# Patient Record
Sex: MALE | Race: WHITE | NOT HISPANIC OR LATINO | Employment: OTHER
[De-identification: names, ages, dates, MRNs, and addresses within clinical notes are randomized per-mention and may not be internally consistent; named-entity substitution may affect disease eponyms.]

---

## 2017-03-09 ENCOUNTER — RECORDS - HEALTHEAST (OUTPATIENT)
Dept: ADMINISTRATIVE | Facility: OTHER | Age: 62
End: 2017-03-09

## 2021-05-27 ENCOUNTER — RECORDS - HEALTHEAST (OUTPATIENT)
Dept: ADMINISTRATIVE | Facility: CLINIC | Age: 66
End: 2021-05-27

## 2021-06-01 ENCOUNTER — RECORDS - HEALTHEAST (OUTPATIENT)
Dept: ADMINISTRATIVE | Facility: CLINIC | Age: 66
End: 2021-06-01

## 2021-07-13 ENCOUNTER — LAB REQUISITION (OUTPATIENT)
Dept: LAB | Facility: CLINIC | Age: 66
End: 2021-07-13
Payer: MEDICARE

## 2021-07-13 DIAGNOSIS — M81.0 AGE-RELATED OSTEOPOROSIS WITHOUT CURRENT PATHOLOGICAL FRACTURE: ICD-10-CM

## 2021-07-13 DIAGNOSIS — K21.9 GASTRO-ESOPHAGEAL REFLUX DISEASE WITHOUT ESOPHAGITIS: ICD-10-CM

## 2021-07-13 DIAGNOSIS — G93.49 OTHER ENCEPHALOPATHY: ICD-10-CM

## 2021-07-13 LAB
ALBUMIN SERPL-MCNC: 2.6 G/DL (ref 3.5–5)
ALP SERPL-CCNC: 62 U/L (ref 45–120)
ALT SERPL W P-5'-P-CCNC: 11 U/L (ref 0–45)
ANION GAP SERPL CALCULATED.3IONS-SCNC: 14 MMOL/L (ref 5–18)
AST SERPL W P-5'-P-CCNC: 12 U/L (ref 0–40)
BASOPHILS # BLD AUTO: 0.1 10E3/UL (ref 0–0.2)
BASOPHILS NFR BLD AUTO: 1 %
BILIRUB SERPL-MCNC: 0.3 MG/DL (ref 0–1)
BUN SERPL-MCNC: 13 MG/DL (ref 8–22)
CALCIUM SERPL-MCNC: 9.2 MG/DL (ref 8.5–10.5)
CHLORIDE BLD-SCNC: 103 MMOL/L (ref 98–107)
CO2 SERPL-SCNC: 23 MMOL/L (ref 22–31)
CREAT SERPL-MCNC: 0.68 MG/DL (ref 0.7–1.3)
EOSINOPHIL # BLD AUTO: 0.1 10E3/UL (ref 0–0.7)
EOSINOPHIL NFR BLD AUTO: 2 %
ERYTHROCYTE [DISTWIDTH] IN BLOOD BY AUTOMATED COUNT: 14.1 % (ref 10–15)
GFR SERPL CREATININE-BSD FRML MDRD: >90 ML/MIN/1.73M2
GLUCOSE BLD-MCNC: 80 MG/DL (ref 70–125)
HCT VFR BLD AUTO: 37.4 % (ref 40–53)
HGB BLD-MCNC: 12.5 G/DL (ref 13.3–17.7)
IMM GRANULOCYTES # BLD: 0.1 10E3/UL
IMM GRANULOCYTES NFR BLD: 1 %
IRON SATN MFR SERPL: 27 % (ref 20–50)
IRON SERPL-MCNC: 86 UG/DL (ref 42–175)
LYMPHOCYTES # BLD AUTO: 3.1 10E3/UL (ref 0.8–5.3)
LYMPHOCYTES NFR BLD AUTO: 39 %
MAGNESIUM SERPL-MCNC: 1.7 MG/DL (ref 1.8–2.6)
MCH RBC QN AUTO: 34.1 PG (ref 26.5–33)
MCHC RBC AUTO-ENTMCNC: 33.4 G/DL (ref 31.5–36.5)
MCV RBC AUTO: 102 FL (ref 78–100)
MONOCYTES # BLD AUTO: 0.6 10E3/UL (ref 0–1.3)
MONOCYTES NFR BLD AUTO: 8 %
NEUTROPHILS # BLD AUTO: 3.8 10E3/UL (ref 1.6–8.3)
NEUTROPHILS NFR BLD AUTO: 49 %
NRBC # BLD AUTO: 0 10E3/UL
NRBC BLD AUTO-RTO: 0 /100
PLATELET # BLD AUTO: 473 10E3/UL (ref 150–450)
POTASSIUM BLD-SCNC: 4.8 MMOL/L (ref 3.5–5)
PROT SERPL-MCNC: 6.2 G/DL (ref 6–8)
RBC # BLD AUTO: 3.67 10E6/UL (ref 4.4–5.9)
SODIUM SERPL-SCNC: 140 MMOL/L (ref 136–145)
TIBC SERPL-MCNC: 318 UG/DL (ref 313–563)
TRANSFERRIN SERPL-MCNC: 254 MG/DL (ref 212–360)
TSH SERPL DL<=0.005 MIU/L-ACNC: 2.48 UIU/ML (ref 0.3–5)
VIT B12 SERPL-MCNC: 1148 PG/ML (ref 213–816)
WBC # BLD AUTO: 7.8 10E3/UL (ref 4–11)

## 2021-07-13 PROCEDURE — 36415 COLL VENOUS BLD VENIPUNCTURE: CPT | Mod: ORL | Performed by: NURSE PRACTITIONER

## 2021-07-13 PROCEDURE — 85025 COMPLETE CBC W/AUTO DIFF WBC: CPT | Mod: ORL | Performed by: NURSE PRACTITIONER

## 2021-07-13 PROCEDURE — P9604 ONE-WAY ALLOW PRORATED TRIP: HCPCS | Performed by: NURSE PRACTITIONER

## 2021-07-13 PROCEDURE — 84443 ASSAY THYROID STIM HORMONE: CPT | Mod: ORL | Performed by: NURSE PRACTITIONER

## 2021-07-13 PROCEDURE — 83540 ASSAY OF IRON: CPT | Mod: ORL | Performed by: NURSE PRACTITIONER

## 2021-07-13 PROCEDURE — 80053 COMPREHEN METABOLIC PANEL: CPT | Performed by: NURSE PRACTITIONER

## 2021-07-13 PROCEDURE — 82607 VITAMIN B-12: CPT | Mod: ORL | Performed by: NURSE PRACTITIONER

## 2021-07-13 PROCEDURE — 83735 ASSAY OF MAGNESIUM: CPT | Mod: ORL | Performed by: NURSE PRACTITIONER

## 2021-07-18 ENCOUNTER — LAB REQUISITION (OUTPATIENT)
Dept: LAB | Facility: CLINIC | Age: 66
End: 2021-07-18
Payer: MEDICARE

## 2021-07-18 DIAGNOSIS — D64.89 OTHER SPECIFIED ANEMIAS: ICD-10-CM

## 2021-07-19 LAB
FOLATE SERPL-MCNC: 11.4 NG/ML
HGB BLD-MCNC: 12.9 G/DL (ref 13.3–17.7)

## 2021-07-19 PROCEDURE — 36415 COLL VENOUS BLD VENIPUNCTURE: CPT | Performed by: INTERNAL MEDICINE

## 2021-07-19 PROCEDURE — P9604 ONE-WAY ALLOW PRORATED TRIP: HCPCS | Performed by: INTERNAL MEDICINE

## 2021-07-19 PROCEDURE — 85018 HEMOGLOBIN: CPT | Performed by: INTERNAL MEDICINE

## 2021-07-19 PROCEDURE — 82746 ASSAY OF FOLIC ACID SERUM: CPT | Performed by: INTERNAL MEDICINE

## 2021-07-29 ENCOUNTER — HOSPITAL ENCOUNTER (INPATIENT)
Facility: CLINIC | Age: 66
LOS: 8 days | Discharge: NURSING, REHAB OR LONG-TERM CARE | DRG: 689 | End: 2021-08-06
Attending: EMERGENCY MEDICINE | Admitting: INTERNAL MEDICINE
Payer: MEDICARE

## 2021-07-29 ENCOUNTER — APPOINTMENT (OUTPATIENT)
Dept: CT IMAGING | Facility: CLINIC | Age: 66
DRG: 689 | End: 2021-07-29
Attending: EMERGENCY MEDICINE
Payer: MEDICARE

## 2021-07-29 ENCOUNTER — APPOINTMENT (OUTPATIENT)
Dept: RADIOLOGY | Facility: CLINIC | Age: 66
DRG: 689 | End: 2021-07-29
Attending: EMERGENCY MEDICINE
Payer: MEDICARE

## 2021-07-29 ENCOUNTER — APPOINTMENT (OUTPATIENT)
Dept: ULTRASOUND IMAGING | Facility: CLINIC | Age: 66
DRG: 689 | End: 2021-07-29
Attending: EMERGENCY MEDICINE
Payer: MEDICARE

## 2021-07-29 DIAGNOSIS — R59.0 MEDIASTINAL LYMPHADENOPATHY: ICD-10-CM

## 2021-07-29 DIAGNOSIS — N28.1 KIDNEY CYSTS: Primary | ICD-10-CM

## 2021-07-29 DIAGNOSIS — J98.4 PNEUMONITIS: ICD-10-CM

## 2021-07-29 DIAGNOSIS — I63.9 CEREBROVASCULAR ACCIDENT (CVA), UNSPECIFIED MECHANISM (H): ICD-10-CM

## 2021-07-29 DIAGNOSIS — N39.0 URINARY TRACT INFECTION WITHOUT HEMATURIA, SITE UNSPECIFIED: ICD-10-CM

## 2021-07-29 DIAGNOSIS — D73.5 SPLENIC INFARCT: ICD-10-CM

## 2021-07-29 PROBLEM — G43.909 MIGRAINE HEADACHE: Status: ACTIVE | Noted: 2021-07-29

## 2021-07-29 PROBLEM — N31.9 NEUROGENIC BLADDER: Status: ACTIVE | Noted: 2021-07-29

## 2021-07-29 PROBLEM — L25.9 CONTACT DERMATITIS AND OTHER ECZEMA: Status: ACTIVE | Noted: 2021-07-29

## 2021-07-29 PROBLEM — M79.609 PAIN IN EXTREMITY: Status: ACTIVE | Noted: 2021-07-29

## 2021-07-29 PROBLEM — M47.816 LUMBAR SPONDYLOSIS: Status: ACTIVE | Noted: 2021-07-29

## 2021-07-29 PROBLEM — E22.9 OTHER AND UNSPECIFIED ANTERIOR PITUITARY HYPERFUNCTION: Status: ACTIVE | Noted: 2021-07-29

## 2021-07-29 PROBLEM — K21.9 GASTROESOPHAGEAL REFLUX DISEASE: Status: ACTIVE | Noted: 2021-07-29

## 2021-07-29 PROBLEM — M70.70 BURSITIS OF HIP: Status: ACTIVE | Noted: 2021-07-29

## 2021-07-29 PROBLEM — N52.9 IMPOTENCE OF ORGANIC ORIGIN: Status: ACTIVE | Noted: 2021-07-29

## 2021-07-29 PROBLEM — M84.48XA PATHOLOGIC FRACTURE OF VERTEBRAE: Status: ACTIVE | Noted: 2021-07-29

## 2021-07-29 PROBLEM — M54.30 SCIATICA: Status: ACTIVE | Noted: 2021-07-29

## 2021-07-29 PROBLEM — Z91.81 HISTORY OF FALL: Status: ACTIVE | Noted: 2021-07-29

## 2021-07-29 PROBLEM — M81.0 OSTEOPOROSIS: Status: ACTIVE | Noted: 2021-07-29

## 2021-07-29 PROBLEM — M23.302 DERANGEMENT OF MENISCUS, NOT ELSEWHERE CLASSIFIED: Status: ACTIVE | Noted: 2021-07-29

## 2021-07-29 PROBLEM — F17.200 TOBACCO USE DISORDER: Status: ACTIVE | Noted: 2021-07-29

## 2021-07-29 PROBLEM — S92.053A: Status: ACTIVE | Noted: 2021-07-29

## 2021-07-29 PROBLEM — M54.50 LOW BACK PAIN: Status: ACTIVE | Noted: 2021-07-29

## 2021-07-29 PROBLEM — R31.9 HEMATURIA SYNDROME: Status: ACTIVE | Noted: 2021-07-29

## 2021-07-29 PROBLEM — G60.8 HEREDITARY SENSORY NEUROPATHY: Status: ACTIVE | Noted: 2021-07-29

## 2021-07-29 PROBLEM — G47.00 INSOMNIA, UNSPECIFIED: Status: ACTIVE | Noted: 2021-07-29

## 2021-07-29 LAB
ALBUMIN SERPL-MCNC: 2.7 G/DL (ref 3.5–5)
ALBUMIN UR-MCNC: 30 MG/DL
ALP SERPL-CCNC: 57 U/L (ref 45–120)
ALT SERPL W P-5'-P-CCNC: 14 U/L (ref 0–45)
ANION GAP SERPL CALCULATED.3IONS-SCNC: 10 MMOL/L (ref 5–18)
APPEARANCE UR: ABNORMAL
AST SERPL W P-5'-P-CCNC: 22 U/L (ref 0–40)
BACTERIA #/AREA URNS HPF: ABNORMAL /HPF
BASOPHILS # BLD AUTO: 0.1 10E3/UL (ref 0–0.2)
BASOPHILS NFR BLD AUTO: 1 %
BILIRUB SERPL-MCNC: 0.5 MG/DL (ref 0–1)
BILIRUB UR QL STRIP: NEGATIVE
BUN SERPL-MCNC: 21 MG/DL (ref 8–22)
CALCIUM SERPL-MCNC: 8.8 MG/DL (ref 8.5–10.5)
CHLORIDE BLD-SCNC: 103 MMOL/L (ref 98–107)
CO2 SERPL-SCNC: 23 MMOL/L (ref 22–31)
COLOR UR AUTO: YELLOW
CREAT SERPL-MCNC: 0.76 MG/DL (ref 0.7–1.3)
EOSINOPHIL # BLD AUTO: 0 10E3/UL (ref 0–0.7)
EOSINOPHIL NFR BLD AUTO: 0 %
ERYTHROCYTE [DISTWIDTH] IN BLOOD BY AUTOMATED COUNT: 14.8 % (ref 10–15)
GFR SERPL CREATININE-BSD FRML MDRD: >90 ML/MIN/1.73M2
GLUCOSE BLD-MCNC: 87 MG/DL (ref 70–125)
GLUCOSE UR STRIP-MCNC: NEGATIVE MG/DL
HCT VFR BLD AUTO: 35.1 % (ref 40–53)
HGB BLD-MCNC: 11.8 G/DL (ref 13.3–17.7)
HGB UR QL STRIP: ABNORMAL
IMM GRANULOCYTES # BLD: 0 10E3/UL
IMM GRANULOCYTES NFR BLD: 0 %
KETONES UR STRIP-MCNC: NEGATIVE MG/DL
LACTATE SERPL-SCNC: 0.8 MMOL/L (ref 0.7–2)
LEUKOCYTE ESTERASE UR QL STRIP: ABNORMAL
LYMPHOCYTES # BLD AUTO: 0.8 10E3/UL (ref 0.8–5.3)
LYMPHOCYTES NFR BLD AUTO: 16 %
MCH RBC QN AUTO: 33.1 PG (ref 26.5–33)
MCHC RBC AUTO-ENTMCNC: 33.6 G/DL (ref 31.5–36.5)
MCV RBC AUTO: 98 FL (ref 78–100)
MONOCYTES # BLD AUTO: 0.7 10E3/UL (ref 0–1.3)
MONOCYTES NFR BLD AUTO: 13 %
MUCOUS THREADS #/AREA URNS LPF: PRESENT /LPF
NEUTROPHILS # BLD AUTO: 3.6 10E3/UL (ref 1.6–8.3)
NEUTROPHILS NFR BLD AUTO: 70 %
NITRATE UR QL: POSITIVE
NRBC # BLD AUTO: 0 10E3/UL
NRBC BLD AUTO-RTO: 0 /100
PH UR STRIP: 7.5 [PH] (ref 5–7)
PLATELET # BLD AUTO: 145 10E3/UL (ref 150–450)
POTASSIUM BLD-SCNC: 3.9 MMOL/L (ref 3.5–5)
PROT SERPL-MCNC: 6.6 G/DL (ref 6–8)
RBC # BLD AUTO: 3.57 10E6/UL (ref 4.4–5.9)
RBC URINE: 34 /HPF
SARS-COV-2 RNA RESP QL NAA+PROBE: NEGATIVE
SODIUM SERPL-SCNC: 136 MMOL/L (ref 136–145)
SP GR UR STRIP: 1.02 (ref 1–1.03)
SQUAMOUS EPITHELIAL: <1 /HPF
UROBILINOGEN UR STRIP-MCNC: <2 MG/DL
WBC # BLD AUTO: 5.2 10E3/UL (ref 4–11)
WBC CLUMPS #/AREA URNS HPF: PRESENT /HPF
WBC URINE: >182 /HPF

## 2021-07-29 PROCEDURE — 71275 CT ANGIOGRAPHY CHEST: CPT

## 2021-07-29 PROCEDURE — 99285 EMERGENCY DEPT VISIT HI MDM: CPT | Mod: 25

## 2021-07-29 PROCEDURE — 258N000003 HC RX IP 258 OP 636: Performed by: EMERGENCY MEDICINE

## 2021-07-29 PROCEDURE — 250N000011 HC RX IP 250 OP 636: Performed by: EMERGENCY MEDICINE

## 2021-07-29 PROCEDURE — 93971 EXTREMITY STUDY: CPT | Mod: RT

## 2021-07-29 PROCEDURE — 74177 CT ABD & PELVIS W/CONTRAST: CPT

## 2021-07-29 PROCEDURE — 82040 ASSAY OF SERUM ALBUMIN: CPT | Performed by: EMERGENCY MEDICINE

## 2021-07-29 PROCEDURE — 250N000013 HC RX MED GY IP 250 OP 250 PS 637: Performed by: EMERGENCY MEDICINE

## 2021-07-29 PROCEDURE — 70450 CT HEAD/BRAIN W/O DYE: CPT

## 2021-07-29 PROCEDURE — 87086 URINE CULTURE/COLONY COUNT: CPT | Performed by: EMERGENCY MEDICINE

## 2021-07-29 PROCEDURE — 96365 THER/PROPH/DIAG IV INF INIT: CPT | Mod: 59

## 2021-07-29 PROCEDURE — 87186 SC STD MICRODIL/AGAR DIL: CPT | Performed by: EMERGENCY MEDICINE

## 2021-07-29 PROCEDURE — 87635 SARS-COV-2 COVID-19 AMP PRB: CPT | Performed by: EMERGENCY MEDICINE

## 2021-07-29 PROCEDURE — 73630 X-RAY EXAM OF FOOT: CPT | Mod: RT

## 2021-07-29 PROCEDURE — 81001 URINALYSIS AUTO W/SCOPE: CPT | Performed by: EMERGENCY MEDICINE

## 2021-07-29 PROCEDURE — 120N000001 HC R&B MED SURG/OB

## 2021-07-29 PROCEDURE — 36415 COLL VENOUS BLD VENIPUNCTURE: CPT | Performed by: EMERGENCY MEDICINE

## 2021-07-29 PROCEDURE — C9803 HOPD COVID-19 SPEC COLLECT: HCPCS

## 2021-07-29 PROCEDURE — 85025 COMPLETE CBC W/AUTO DIFF WBC: CPT | Performed by: EMERGENCY MEDICINE

## 2021-07-29 PROCEDURE — 83605 ASSAY OF LACTIC ACID: CPT | Performed by: EMERGENCY MEDICINE

## 2021-07-29 RX ORDER — ATORVASTATIN CALCIUM 80 MG/1
40 TABLET, FILM COATED ORAL AT BEDTIME
COMMUNITY
Start: 2020-09-17

## 2021-07-29 RX ORDER — IOPAMIDOL 755 MG/ML
100 INJECTION, SOLUTION INTRAVASCULAR ONCE
Status: COMPLETED | OUTPATIENT
Start: 2021-07-29 | End: 2021-07-29

## 2021-07-29 RX ORDER — IBUPROFEN 400 MG/1
400 TABLET, FILM COATED ORAL 3 TIMES DAILY PRN
Status: ON HOLD | COMMUNITY
End: 2021-08-06

## 2021-07-29 RX ORDER — DIVALPROEX SODIUM 500 MG/1
1000 TABLET, EXTENDED RELEASE ORAL EVERY MORNING
COMMUNITY
Start: 2020-12-16

## 2021-07-29 RX ORDER — ACETAMINOPHEN 500 MG
1000 TABLET ORAL ONCE
Status: COMPLETED | OUTPATIENT
Start: 2021-07-29 | End: 2021-07-29

## 2021-07-29 RX ORDER — VENLAFAXINE 100 MG/1
100 TABLET ORAL 3 TIMES DAILY
COMMUNITY
Start: 2020-07-02 | End: 2021-07-29

## 2021-07-29 RX ORDER — GABAPENTIN 300 MG/1
300 CAPSULE ORAL 3 TIMES DAILY
COMMUNITY
Start: 2021-03-05 | End: 2021-07-29

## 2021-07-29 RX ORDER — AMOXICILLIN 250 MG
2 CAPSULE ORAL 2 TIMES DAILY PRN
COMMUNITY
Start: 2020-08-07

## 2021-07-29 RX ORDER — LISINOPRIL 40 MG/1
30 TABLET ORAL DAILY
COMMUNITY

## 2021-07-29 RX ORDER — ALFUZOSIN HYDROCHLORIDE 10 MG/1
10 TABLET, EXTENDED RELEASE ORAL DAILY
COMMUNITY
Start: 2021-04-19

## 2021-07-29 RX ORDER — MULTIVITAMIN
1 CAPSULE ORAL DAILY
COMMUNITY
Start: 2021-07-01

## 2021-07-29 RX ORDER — ASCORBIC ACID 500 MG
500 TABLET ORAL DAILY
COMMUNITY

## 2021-07-29 RX ORDER — ASPIRIN 325 MG
325 TABLET ORAL DAILY
COMMUNITY
Start: 2021-07-01

## 2021-07-29 RX ORDER — ACETAMINOPHEN 325 MG/1
650 TABLET ORAL EVERY 6 HOURS PRN
COMMUNITY
Start: 2021-07-01

## 2021-07-29 RX ORDER — ALENDRONATE SODIUM 70 MG/1
70 TABLET ORAL WEEKLY
COMMUNITY
Start: 2021-01-25

## 2021-07-29 RX ORDER — DIVALPROEX SODIUM 500 MG/1
1500 TABLET, EXTENDED RELEASE ORAL AT BEDTIME
COMMUNITY

## 2021-07-29 RX ORDER — CEFTRIAXONE 1 G/1
1 INJECTION, POWDER, FOR SOLUTION INTRAMUSCULAR; INTRAVENOUS ONCE
Status: COMPLETED | OUTPATIENT
Start: 2021-07-29 | End: 2021-07-29

## 2021-07-29 RX ORDER — BACLOFEN 20 MG/1
20 TABLET ORAL 3 TIMES DAILY
Status: ON HOLD | COMMUNITY
Start: 2021-04-19 | End: 2021-08-06

## 2021-07-29 RX ADMIN — ACETAMINOPHEN 1000 MG: 500 TABLET, FILM COATED ORAL at 21:29

## 2021-07-29 RX ADMIN — CEFTRIAXONE 1 G: 1 INJECTION, POWDER, FOR SOLUTION INTRAMUSCULAR; INTRAVENOUS at 19:25

## 2021-07-29 RX ADMIN — SODIUM CHLORIDE 1000 ML: 9 INJECTION, SOLUTION INTRAVENOUS at 19:24

## 2021-07-29 RX ADMIN — IOPAMIDOL 100 ML: 755 INJECTION, SOLUTION INTRAVENOUS at 21:36

## 2021-07-29 ASSESSMENT — ENCOUNTER SYMPTOMS
DIFFICULTY URINATING: 1
DYSURIA: 1
FEVER: 1
ARTHRALGIAS: 1
ABDOMINAL PAIN: 0

## 2021-07-29 NOTE — ED PROVIDER NOTES
EMERGENCY DEPARTMENT ENCOUNTER      NAME: Goldy Peraza  AGE: 66 year old male  YOB: 1955  MRN: [id number]  EVALUATION DATE & TIME: 7/29/2021  5:45 PM    PCP: No primary care provider on file.    ED PROVIDER: Alma Gonzalez DO      Chief Complaint   Patient presents with     Dysuria         FINAL IMPRESSION:  1. Urinary tract infection without hematuria, site unspecified    2. Pneumonitis          ED COURSE & MEDICAL DECISION MAKING:    Pertinent Labs & Imaging studies reviewed. (See chart for details)  6:23 PM I met with the patient and performed my initial exam.  I discussed the plan for care and the patient is agreeable with this plan. PPE: Provider wore gloves, and paper mask.  6:48 I spoke with the patient's son.  I re-entered the room when family arrived to obtain more history  10:12 PM I rechecked and updated the patient.  12:27 AM I rechecked and updated the patient.  12:34 AM I discussed the case with hospitalist, Dr Gates, who accepts the patient.    66 year old male presents to the Emergency Department for evaluation of dysuria, urinary frequency.  Has been going on for several days.  Patient with a history of a prior stroke, right-sided pain.  Patient did have a fall a few days ago, family noted swelling to his right leg since then.  He initially had pain to his right foot.  On exam today, patient noting pain to his right arm and right hip.  Patient notes chronic pain to his right side since his stroke, however does seem to be endorsing more pain to his hip.  Recent admission to [facility] for acute encephalopathy, he did have a UTI at that time but was not thought to be the cause of his symptoms.  Patient is at his baseline here per family.  Given his fall, obtain CT of the head.  CTA chest obtained as patient was hypoxic to 88% on room air, this shows a mild to moderate pneumonitis.  His Covid is negative.  No lactic acidosis.  CT of the abdomen pelvis does show possible focal area of  acute splenic infarct.  Renal cyst that needs to be followed up on.  Right hip without any acute findings.  Given patient's oxygen requirement with his infection, will admit.  Given ceftriaxone.  Patient boarding in the ED, discussed with hospitalist at 12:34 AM about patient's care.    At the conclusion of the encounter I discussed the results of all of the tests and the disposition. The questions were answered. The patient or family acknowledged understanding and was agreeable with the care plan.       MEDICATIONS GIVEN IN THE EMERGENCY:  Medications   sodium chloride (PF) 0.9% PF flush 3 mL (has no administration in time range)   sodium chloride (PF) 0.9% PF flush 3 mL (has no administration in time range)   cefTRIAXone (ROCEPHIN) 1 g vial to attach to  mL bag for ADULTS or NS 50 mL bag for PEDS (0 g Intravenous Stopped 7/29/21 2000)   0.9% sodium chloride BOLUS (0 mLs Intravenous Stopped 7/29/21 2100)   iopamidol (ISOVUE-370) solution 100 mL (100 mLs Intravenous Given 7/29/21 2136)   acetaminophen (TYLENOL) tablet 1,000 mg (1,000 mg Oral Given 7/29/21 2129)       NEW PRESCRIPTIONS STARTED AT TODAY'S ER VISIT  New Prescriptions    No medications on file          =================================================================    HPI    Patient information was obtained from: the patient and RN    Use of : N/A    HPI limited due to acuity of the patient's condition.       Goldy Peraza is a 66 year old male with a pertinent history of hypertension, stoke, altered mental status, hematuria syndrome, neurogenic bladder who presents to this ED by EMS for evaluation of dysuria.    Per RN, the patient has been having testicular pain for ~ 10 days.      Per EMS, the patient had a measurable fever.     The patient presents to the ED with Dysuria.  It was difficult to gather much information from the patient, but he did report that he has right arm pain, right leg pain, and has had difficulty urinating.       Upon re-entry after the patient's family presented: Per the patient's sister, the patient was in the hospital for several weeks after not waking up despite multiple people trying to stir him.  In the hospital, there was not a definitive diagnosis.  It was found that the patient did not have a stroke or seizure.  Also, the patient's sister noted, the patient contracted a urinary infection while in the hospital. The patient was walking yesterday and his therapist reported the patient was doing well.  However, he has had difficulty urinating and burning pain with urination for ~2-3 days (~7/26/21) after falling on 7/25/21. However, last night she reports he urinated a large amount. Today, the patient was out of sorts and his nurse called 911.    The patient's sister noted, the patient's right sided pain is constant.  The patient's son and sister both re-examined the patient's right leg which has been swollen and noted that is was better in the ED than it was before.      The patient is also reporting right hip pain which his son notes his new.      The patient denies fever, abdominal pain, testicular pain.      REVIEW OF SYSTEMS   Review of Systems   Unable to perform ROS: Acuity of condition   Constitutional: Positive for fever (measured by EMS).        The patient denies fever.   Gastrointestinal: Negative for abdominal pain.   Genitourinary: Positive for decreased urine volume, difficulty urinating, dysuria (burning pain) and testicular pain (per RN, patient denies this).   Musculoskeletal: Positive for arthralgias (right arm and leg pain, and right hip pain).        Positive for chronic right sided pain.  Positive for improved right foot swelling.   Neurological:        Positive for 'out of sorts'.       PAST MEDICAL HISTORY:  No past medical history on file.    PAST SURGICAL HISTORY:  Past Surgical History:   Procedure Laterality Date     IR MISCELLANEOUS PROCEDURE  3/18/2009     IR MISCELLANEOUS PROCEDURE   3/20/2009           CURRENT MEDICATIONS:    acetaminophen (TYLENOL) 325 MG tablet  alendronate (FOSAMAX) 70 MG tablet  alfuzosin ER (UROXATRAL) 10 MG 24 hr tablet  aspirin (ASA) 325 MG tablet  atorvastatin (LIPITOR) 80 MG tablet  calcium citrate-vitamin D (CITRACAL) 315-200 MG-UNIT TABS per tablet  cholecalciferol 25 MCG (1000 UT) TABS  diclofenac (VOLTAREN) 1 % topical gel  divalproex sodium extended-release (DEPAKOTE ER) 500 MG 24 hr tablet  divalproex sodium extended-release (DEPAKOTE ER) 500 MG 24 hr tablet  gabapentin (NEURONTIN) 300 MG capsule  ibuprofen (ADVIL/MOTRIN) 400 MG tablet  lisinopril (ZESTRIL) 40 MG tablet  multivitamin (DEKAS ESSENTIAL) capsule  omeprazole (PRILOSEC) 20 MG DR capsule  senna-docusate (SENOKOT-S/PERICOLACE) 8.6-50 MG tablet  vitamin C (ASCORBIC ACID) 500 MG tablet         ALLERGIES:  No Known Allergies    FAMILY HISTORY:  No family history on file.    SOCIAL HISTORY:   Social History     Socioeconomic History     Marital status: Single     Spouse name: Not on file     Number of children: Not on file     Years of education: Not on file     Highest education level: Not on file   Occupational History     Not on file   Tobacco Use     Smoking status: Not on file   Substance and Sexual Activity     Alcohol use: Not on file     Drug use: Not on file     Sexual activity: Not on file   Other Topics Concern     Not on file   Social History Narrative     Not on file     Social Determinants of Health     Financial Resource Strain:      Difficulty of Paying Living Expenses:    Food Insecurity:      Worried About Running Out of Food in the Last Year:      Ran Out of Food in the Last Year:    Transportation Needs:      Lack of Transportation (Medical):      Lack of Transportation (Non-Medical):    Physical Activity:      Days of Exercise per Week:      Minutes of Exercise per Session:    Stress:      Feeling of Stress :    Social Connections:      Frequency of Communication with Friends and  "Family:      Frequency of Social Gatherings with Friends and Family:      Attends Jewish Services:      Active Member of Clubs or Organizations:      Attends Club or Organization Meetings:      Marital Status:    Intimate Partner Violence:      Fear of Current or Ex-Partner:      Emotionally Abused:      Physically Abused:      Sexually Abused:        VITALS:  BP (!) 140/78   Pulse 65   Temp 99  F (37.2  C) (Oral)   Resp 27   Ht 1.778 m (5' 10\")   Wt 95.3 kg (210 lb)   SpO2 96%   BMI 30.13 kg/m      PHYSICAL EXAM    Physical Exam  Constitutional:       General: He is not in acute distress.  HENT:      Head: Normocephalic and atraumatic.      Mouth/Throat:      Pharynx: Oropharynx is clear.   Eyes:      Pupils: Pupils are equal, round, and reactive to light.   Cardiovascular:      Rate and Rhythm: Normal rate and regular rhythm.      Pulses: Normal pulses.           Radial pulses are 2+ on the right side and 2+ on the left side.        Dorsalis pedis pulses are 2+ on the right side and 2+ on the left side.      Heart sounds: Normal heart sounds.   Pulmonary:      Effort: Pulmonary effort is normal.      Breath sounds: Normal breath sounds.   Abdominal:      General: Abdomen is flat.      Palpations: Abdomen is soft.      Tenderness: There is generalized abdominal tenderness.   Genitourinary:     Penis: Normal.       Testes: Normal.   Musculoskeletal:         General: Normal range of motion.      Right lower leg: Edema present.      Comments: Right hip and right foot tenderness. Right lower extremity tenderness. Contracture of the right upper extremity with right upper extremity tenderness.    Skin:     General: Skin is warm and dry.      Capillary Refill: Capillary refill takes less than 2 seconds.   Neurological:      General: No focal deficit present.      Mental Status: He is alert and oriented to person, place, and time.           LAB:  All pertinent labs reviewed and interpreted.  Results for orders " placed or performed during the hospital encounter of 07/29/21   Foot  XR, G/E 3 views, right    Impression    IMPRESSION:   Moderate hallux valgus. Diffuse osteopenia. No evidence of acute fracture. Mild first MTP joint degenerative changes.   CT Chest Pulmonary Embolism w Contrast    Impression    IMPRESSION:  1.  Negative for pulmonary embolism. Enlargement of the central pulmonary arteries suggest pulmonary arterial hypertension.    2.  Interstitial fibrotic change in the periphery of the lungs.    3.  Mild patchy scattered groundglass opacities in the periphery of the lungs are nonspecific and could be seen with a mild or low-grade pneumonitis. Clinical correlation.    4.  Mild bilateral hilar and mediastinal adenopathy. This may be reactive in nature but follow-up CT in 3 months is recommended for reevaluation to exclude any progressive process.    5.  Marked coronary artery calcification.   CT Abdomen Pelvis w Contrast    Impression    IMPRESSION:   1.  Focal area of peripheral decreased enhancement in the spleen medially is concerning for a small acute splenic infarct.  2.  Partially exophytic complex cyst in the right kidney lower pole measuring 6.2 cm with multiple septations. A cystic renal cell neoplasm cannot be excluded. An MRI of the kidneys without and with IV gadolinium is recommended in the near future.  3.  Multiple low-attenuation lesions in the liver. These may represent benign cysts and can be reassessed at the time of the renal MRI.  4.  Moderate changes of pulmonary fibrosis in both lung bases.  5.  Diffuse atheromatous plaque in the abdominal aorta.   Head CT w/o contrast    Impression    IMPRESSION:  1.  No acute intracranial process.   US Lower Extremity Venous Duplex Right    Impression    IMPRESSION:  1.  No deep venous thrombosis in the right lower extremity.   UA with Microscopic reflex to Culture    Specimen: Urine, Clean Catch   Result Value Ref Range    Color Urine Yellow Colorless,  Straw, Light Yellow, Yellow    Appearance Urine Turbid (A) Clear    Glucose Urine Negative Negative mg/dL    Bilirubin Urine Negative Negative    Ketones Urine Negative Negative mg/dL    Specific Gravity Urine 1.016 1.001 - 1.030    Blood Urine 0.1 mg/dL (A) Negative    pH Urine 7.5 (H) 5.0 - 7.0    Protein Albumin Urine 30  (A) Negative mg/dL    Urobilinogen Urine <2.0 <2.0 mg/dL    Nitrite Urine Positive (A) Negative    Leukocyte Esterase Urine 500 Juju/uL (A) Negative    Bacteria Urine Few (A) None Seen /HPF    WBC Clumps Urine Present (A) None Seen /HPF    Mucus Urine Present (A) None Seen /LPF    RBC Urine 34 (H) <=2 /HPF    WBC Urine >182 (H) <=5 /HPF    Squamous Epithelials Urine <1 <=1 /HPF   Comprehensive metabolic panel   Result Value Ref Range    Sodium 136 136 - 145 mmol/L    Potassium 3.9 3.5 - 5.0 mmol/L    Chloride 103 98 - 107 mmol/L    Carbon Dioxide (CO2) 23 22 - 31 mmol/L    Anion Gap 10 5 - 18 mmol/L    Urea Nitrogen 21 8 - 22 mg/dL    Creatinine 0.76 0.70 - 1.30 mg/dL    Calcium 8.8 8.5 - 10.5 mg/dL    Glucose 87 70 - 125 mg/dL    Alkaline Phosphatase 57 45 - 120 U/L    AST 22 0 - 40 U/L    ALT 14 0 - 45 U/L    Protein Total 6.6 6.0 - 8.0 g/dL    Albumin 2.7 (L) 3.5 - 5.0 g/dL    Bilirubin Total 0.5 0.0 - 1.0 mg/dL    GFR Estimate >90 >60 mL/min/1.73m2   Lactic acid whole blood   Result Value Ref Range    Lactic Acid 0.8 0.7 - 2.0 mmol/L   CBC with platelets and differential   Result Value Ref Range    WBC Count 5.2 4.0 - 11.0 10e3/uL    RBC Count 3.57 (L) 4.40 - 5.90 10e6/uL    Hemoglobin 11.8 (L) 13.3 - 17.7 g/dL    Hematocrit 35.1 (L) 40.0 - 53.0 %    MCV 98 78 - 100 fL    MCH 33.1 (H) 26.5 - 33.0 pg    MCHC 33.6 31.5 - 36.5 g/dL    RDW 14.8 10.0 - 15.0 %    Platelet Count 145 (L) 150 - 450 10e3/uL    % Neutrophils 70 %    % Lymphocytes 16 %    % Monocytes 13 %    % Eosinophils 0 %    % Basophils 1 %    % Immature Granulocytes 0 %    NRBCs per 100 WBC 0 <1 /100    Absolute Neutrophils 3.6 1.6  - 8.3 10e3/uL    Absolute Lymphocytes 0.8 0.8 - 5.3 10e3/uL    Absolute Monocytes 0.7 0.0 - 1.3 10e3/uL    Absolute Eosinophils 0.0 0.0 - 0.7 10e3/uL    Absolute Basophils 0.1 0.0 - 0.2 10e3/uL    Absolute Immature Granulocytes 0.0 <=0.0 10e3/uL    Absolute NRBCs 0.0 10e3/uL   SARS-COV2 (COVID-19) Virus RT-PCR    Specimen: Nasopharyngeal; Swab   Result Value Ref Range    SARS CoV2 PCR Negative Negative       RADIOLOGY:  Reviewed all pertinent imaging. Please see official radiology report.  US Lower Extremity Venous Duplex Right   Final Result   IMPRESSION:   1.  No deep venous thrombosis in the right lower extremity.      CT Abdomen Pelvis w Contrast   Final Result   IMPRESSION:    1.  Focal area of peripheral decreased enhancement in the spleen medially is concerning for a small acute splenic infarct.   2.  Partially exophytic complex cyst in the right kidney lower pole measuring 6.2 cm with multiple septations. A cystic renal cell neoplasm cannot be excluded. An MRI of the kidneys without and with IV gadolinium is recommended in the near future.   3.  Multiple low-attenuation lesions in the liver. These may represent benign cysts and can be reassessed at the time of the renal MRI.   4.  Moderate changes of pulmonary fibrosis in both lung bases.   5.  Diffuse atheromatous plaque in the abdominal aorta.      CT Chest Pulmonary Embolism w Contrast   Final Result   IMPRESSION:   1.  Negative for pulmonary embolism. Enlargement of the central pulmonary arteries suggest pulmonary arterial hypertension.      2.  Interstitial fibrotic change in the periphery of the lungs.      3.  Mild patchy scattered groundglass opacities in the periphery of the lungs are nonspecific and could be seen with a mild or low-grade pneumonitis. Clinical correlation.      4.  Mild bilateral hilar and mediastinal adenopathy. This may be reactive in nature but follow-up CT in 3 months is recommended for reevaluation to exclude any progressive  process.      5.  Marked coronary artery calcification.      Head CT w/o contrast   Final Result   IMPRESSION:   1.  No acute intracranial process.      Foot  XR, G/E 3 views, right   Final Result   IMPRESSION:    Moderate hallux valgus. Diffuse osteopenia. No evidence of acute fracture. Mild first MTP joint degenerative changes.            I, Primo Prasad, am serving as a scribe to document services personally performed by Dr. Alma Gonzalez based on my observation and the provider's statements to me. I, Alma Gonzalez, DO attest that Primo Prasad is acting in a scribe capacity, has observed my performance of the services and has documented them in accordance with my direction.    Alma Gonzalez DO  Emergency Medicine  [facility] EMERGENCY ROOM  [address] [facility] [facility] [address] [phone]  Dept: [phone]     Alma Gonzalez DO  07/30/21 0120

## 2021-07-29 NOTE — ED TRIAGE NOTES
"Arrives to ED via EMS with c/o dysuria and testicle pain x10 days. Per EMS report, pt febrile at 100.7F. Afebrile on arrival to ED. Appears lethargic. Answering questions with short answers. \"I don't want to talk\". 20g to L hand, IVF infusing. FSG 79. Pt also has R ankle/foot swelling, \"for a long time\".   "

## 2021-07-30 LAB
ALBUMIN SERPL-MCNC: 2.4 G/DL (ref 3.5–5)
ALP SERPL-CCNC: 51 U/L (ref 45–120)
ALT SERPL W P-5'-P-CCNC: 18 U/L (ref 0–45)
ANION GAP SERPL CALCULATED.3IONS-SCNC: 9 MMOL/L (ref 5–18)
AST SERPL W P-5'-P-CCNC: 26 U/L (ref 0–40)
BASOPHILS # BLD AUTO: 0.1 10E3/UL (ref 0–0.2)
BASOPHILS NFR BLD AUTO: 1 %
BILIRUB SERPL-MCNC: 0.4 MG/DL (ref 0–1)
BNP SERPL-MCNC: 135 PG/ML (ref 0–62)
BUN SERPL-MCNC: 17 MG/DL (ref 8–22)
CALCIUM SERPL-MCNC: 8.4 MG/DL (ref 8.5–10.5)
CHLORIDE BLD-SCNC: 105 MMOL/L (ref 98–107)
CO2 SERPL-SCNC: 23 MMOL/L (ref 22–31)
CREAT SERPL-MCNC: 0.7 MG/DL (ref 0.7–1.3)
EOSINOPHIL # BLD AUTO: 0 10E3/UL (ref 0–0.7)
EOSINOPHIL NFR BLD AUTO: 0 %
ERYTHROCYTE [DISTWIDTH] IN BLOOD BY AUTOMATED COUNT: 14.7 % (ref 10–15)
GFR SERPL CREATININE-BSD FRML MDRD: >90 ML/MIN/1.73M2
GLUCOSE BLD-MCNC: 74 MG/DL (ref 70–125)
HCT VFR BLD AUTO: 34.6 % (ref 40–53)
HGB BLD-MCNC: 11.6 G/DL (ref 13.3–17.7)
IMM GRANULOCYTES # BLD: 0 10E3/UL
IMM GRANULOCYTES NFR BLD: 0 %
LYMPHOCYTES # BLD AUTO: 1.3 10E3/UL (ref 0.8–5.3)
LYMPHOCYTES NFR BLD AUTO: 27 %
MAGNESIUM SERPL-MCNC: 1.8 MG/DL (ref 1.8–2.6)
MCH RBC QN AUTO: 33.6 PG (ref 26.5–33)
MCHC RBC AUTO-ENTMCNC: 33.5 G/DL (ref 31.5–36.5)
MCV RBC AUTO: 100 FL (ref 78–100)
MONOCYTES # BLD AUTO: 0.8 10E3/UL (ref 0–1.3)
MONOCYTES NFR BLD AUTO: 16 %
NEUTROPHILS # BLD AUTO: 2.6 10E3/UL (ref 1.6–8.3)
NEUTROPHILS NFR BLD AUTO: 56 %
NRBC # BLD AUTO: 0 10E3/UL
NRBC BLD AUTO-RTO: 0 /100
PLATELET # BLD AUTO: 135 10E3/UL (ref 150–450)
POTASSIUM BLD-SCNC: 3.9 MMOL/L (ref 3.5–5)
POTASSIUM BLD-SCNC: 3.9 MMOL/L (ref 3.5–5)
PROT SERPL-MCNC: 6 G/DL (ref 6–8)
RBC # BLD AUTO: 3.45 10E6/UL (ref 4.4–5.9)
SODIUM SERPL-SCNC: 137 MMOL/L (ref 136–145)
WBC # BLD AUTO: 4.7 10E3/UL (ref 4–11)

## 2021-07-30 PROCEDURE — 99223 1ST HOSP IP/OBS HIGH 75: CPT | Mod: AI | Performed by: INTERNAL MEDICINE

## 2021-07-30 PROCEDURE — 250N000011 HC RX IP 250 OP 636: Performed by: INTERNAL MEDICINE

## 2021-07-30 PROCEDURE — 96360 HYDRATION IV INFUSION INIT: CPT | Mod: 59

## 2021-07-30 PROCEDURE — 258N000003 HC RX IP 258 OP 636: Performed by: INTERNAL MEDICINE

## 2021-07-30 PROCEDURE — 82040 ASSAY OF SERUM ALBUMIN: CPT | Performed by: INTERNAL MEDICINE

## 2021-07-30 PROCEDURE — 83880 ASSAY OF NATRIURETIC PEPTIDE: CPT | Performed by: INTERNAL MEDICINE

## 2021-07-30 PROCEDURE — 85025 COMPLETE CBC W/AUTO DIFF WBC: CPT | Performed by: INTERNAL MEDICINE

## 2021-07-30 PROCEDURE — 250N000013 HC RX MED GY IP 250 OP 250 PS 637: Performed by: INTERNAL MEDICINE

## 2021-07-30 PROCEDURE — 120N000001 HC R&B MED SURG/OB

## 2021-07-30 PROCEDURE — 250N000013 HC RX MED GY IP 250 OP 250 PS 637: Performed by: EMERGENCY MEDICINE

## 2021-07-30 PROCEDURE — 84132 ASSAY OF SERUM POTASSIUM: CPT | Performed by: INTERNAL MEDICINE

## 2021-07-30 PROCEDURE — 99207 PR CDG-CODE CATEGORY CHANGED: CPT | Performed by: INTERNAL MEDICINE

## 2021-07-30 PROCEDURE — 83735 ASSAY OF MAGNESIUM: CPT | Performed by: INTERNAL MEDICINE

## 2021-07-30 PROCEDURE — 36415 COLL VENOUS BLD VENIPUNCTURE: CPT | Performed by: INTERNAL MEDICINE

## 2021-07-30 RX ORDER — ACETAMINOPHEN 650 MG/1
650 SUPPOSITORY RECTAL EVERY 6 HOURS PRN
Status: DISCONTINUED | OUTPATIENT
Start: 2021-07-30 | End: 2021-08-06 | Stop reason: HOSPADM

## 2021-07-30 RX ORDER — ASCORBIC ACID 500 MG
500 TABLET ORAL DAILY
Status: DISCONTINUED | OUTPATIENT
Start: 2021-07-30 | End: 2021-08-06 | Stop reason: HOSPADM

## 2021-07-30 RX ORDER — ACETAMINOPHEN 325 MG/1
650 TABLET ORAL EVERY 6 HOURS PRN
Status: DISCONTINUED | OUTPATIENT
Start: 2021-07-30 | End: 2021-07-30

## 2021-07-30 RX ORDER — PANTOPRAZOLE SODIUM 20 MG/1
40 TABLET, DELAYED RELEASE ORAL
Status: DISCONTINUED | OUTPATIENT
Start: 2021-07-30 | End: 2021-08-06 | Stop reason: HOSPADM

## 2021-07-30 RX ORDER — ASPIRIN 325 MG
325 TABLET ORAL DAILY
Status: DISCONTINUED | OUTPATIENT
Start: 2021-07-30 | End: 2021-08-06 | Stop reason: HOSPADM

## 2021-07-30 RX ORDER — SODIUM CHLORIDE 9 MG/ML
INJECTION, SOLUTION INTRAVENOUS CONTINUOUS
Status: DISCONTINUED | OUTPATIENT
Start: 2021-07-30 | End: 2021-08-02

## 2021-07-30 RX ORDER — ATORVASTATIN CALCIUM 40 MG/1
40 TABLET, FILM COATED ORAL AT BEDTIME
Status: DISCONTINUED | OUTPATIENT
Start: 2021-07-30 | End: 2021-08-06 | Stop reason: HOSPADM

## 2021-07-30 RX ORDER — ALFUZOSIN HYDROCHLORIDE 10 MG/1
10 TABLET, EXTENDED RELEASE ORAL DAILY
Status: DISCONTINUED | OUTPATIENT
Start: 2021-07-30 | End: 2021-08-06 | Stop reason: HOSPADM

## 2021-07-30 RX ORDER — BACLOFEN 10 MG/1
20 TABLET ORAL 3 TIMES DAILY
Status: DISCONTINUED | OUTPATIENT
Start: 2021-07-30 | End: 2021-08-06 | Stop reason: HOSPADM

## 2021-07-30 RX ORDER — MULTIVITAMIN
1 CAPSULE ORAL DAILY
Status: DISCONTINUED | OUTPATIENT
Start: 2021-07-30 | End: 2021-07-30 | Stop reason: CLARIF

## 2021-07-30 RX ORDER — CEFTRIAXONE 1 G/1
1 INJECTION, POWDER, FOR SOLUTION INTRAMUSCULAR; INTRAVENOUS EVERY 24 HOURS
Status: DISCONTINUED | OUTPATIENT
Start: 2021-07-30 | End: 2021-08-01

## 2021-07-30 RX ORDER — GABAPENTIN 300 MG/1
300 CAPSULE ORAL 3 TIMES DAILY
Status: DISCONTINUED | OUTPATIENT
Start: 2021-07-30 | End: 2021-08-06 | Stop reason: HOSPADM

## 2021-07-30 RX ORDER — LIDOCAINE 40 MG/G
CREAM TOPICAL
Status: DISCONTINUED | OUTPATIENT
Start: 2021-07-30 | End: 2021-08-06 | Stop reason: HOSPADM

## 2021-07-30 RX ORDER — VITAMIN B COMPLEX
25 TABLET ORAL DAILY
Status: DISCONTINUED | OUTPATIENT
Start: 2021-07-30 | End: 2021-08-06 | Stop reason: HOSPADM

## 2021-07-30 RX ORDER — ACETAMINOPHEN 325 MG/1
650 TABLET ORAL ONCE
Status: COMPLETED | OUTPATIENT
Start: 2021-07-30 | End: 2021-07-30

## 2021-07-30 RX ORDER — ACETAMINOPHEN 325 MG/1
650 TABLET ORAL EVERY 6 HOURS PRN
Status: DISCONTINUED | OUTPATIENT
Start: 2021-07-30 | End: 2021-08-06 | Stop reason: HOSPADM

## 2021-07-30 RX ORDER — AMOXICILLIN 250 MG
2 CAPSULE ORAL 2 TIMES DAILY PRN
Status: DISCONTINUED | OUTPATIENT
Start: 2021-07-30 | End: 2021-08-06 | Stop reason: HOSPADM

## 2021-07-30 RX ORDER — DIVALPROEX SODIUM 500 MG/1
1500 TABLET, EXTENDED RELEASE ORAL AT BEDTIME
Status: DISCONTINUED | OUTPATIENT
Start: 2021-07-30 | End: 2021-08-06 | Stop reason: HOSPADM

## 2021-07-30 RX ORDER — MULTIPLE VITAMINS W/ MINERALS TAB 9MG-400MCG
1 TAB ORAL DAILY
Status: DISCONTINUED | OUTPATIENT
Start: 2021-07-30 | End: 2021-08-06 | Stop reason: HOSPADM

## 2021-07-30 RX ORDER — DIVALPROEX SODIUM 500 MG/1
1000 TABLET, EXTENDED RELEASE ORAL EVERY MORNING
Status: DISCONTINUED | OUTPATIENT
Start: 2021-07-30 | End: 2021-08-06 | Stop reason: HOSPADM

## 2021-07-30 RX ADMIN — GABAPENTIN 300 MG: 300 CAPSULE ORAL at 14:36

## 2021-07-30 RX ADMIN — ALFUZOSIN HYDROCHLORIDE 10 MG: 10 TABLET ORAL at 11:31

## 2021-07-30 RX ADMIN — CEFTRIAXONE 1 G: 1 INJECTION, POWDER, FOR SOLUTION INTRAMUSCULAR; INTRAVENOUS at 19:26

## 2021-07-30 RX ADMIN — Medication 2 TABLET: at 11:31

## 2021-07-30 RX ADMIN — MULTIPLE VITAMINS W/ MINERALS TAB 1 TABLET: TAB at 11:32

## 2021-07-30 RX ADMIN — ACETAMINOPHEN AND CODEINE PHOSPHATE 1 HALF-TAB: 300; 30 TABLET ORAL at 15:30

## 2021-07-30 RX ADMIN — OXYCODONE HYDROCHLORIDE AND ACETAMINOPHEN 500 MG: 500 TABLET ORAL at 11:32

## 2021-07-30 RX ADMIN — BACLOFEN 20 MG: 10 TABLET ORAL at 19:24

## 2021-07-30 RX ADMIN — ACETAMINOPHEN 650 MG: 325 TABLET ORAL at 23:54

## 2021-07-30 RX ADMIN — ACETAMINOPHEN 650 MG: 325 TABLET ORAL at 05:14

## 2021-07-30 RX ADMIN — DIVALPROEX SODIUM 1500 MG: 500 TABLET, EXTENDED RELEASE ORAL at 21:48

## 2021-07-30 RX ADMIN — GABAPENTIN 300 MG: 300 CAPSULE ORAL at 19:24

## 2021-07-30 RX ADMIN — SODIUM CHLORIDE: 9 INJECTION, SOLUTION INTRAVENOUS at 11:30

## 2021-07-30 RX ADMIN — SODIUM CHLORIDE: 9 INJECTION, SOLUTION INTRAVENOUS at 23:55

## 2021-07-30 RX ADMIN — PANTOPRAZOLE SODIUM 40 MG: 20 TABLET, DELAYED RELEASE ORAL at 11:31

## 2021-07-30 RX ADMIN — ENOXAPARIN SODIUM 40 MG: 100 INJECTION SUBCUTANEOUS at 12:54

## 2021-07-30 RX ADMIN — ASPIRIN 325 MG ORAL TABLET 325 MG: 325 PILL ORAL at 10:46

## 2021-07-30 RX ADMIN — DIVALPROEX SODIUM 1000 MG: 500 TABLET, EXTENDED RELEASE ORAL at 11:33

## 2021-07-30 RX ADMIN — ACETAMINOPHEN 650 MG: 325 TABLET ORAL at 10:46

## 2021-07-30 RX ADMIN — BACLOFEN 20 MG: 10 TABLET ORAL at 14:15

## 2021-07-30 RX ADMIN — LISINOPRIL 30 MG: 20 TABLET ORAL at 11:32

## 2021-07-30 RX ADMIN — ATORVASTATIN CALCIUM 40 MG: 40 TABLET, FILM COATED ORAL at 21:48

## 2021-07-30 RX ADMIN — Medication 25 MCG: at 11:33

## 2021-07-30 ASSESSMENT — ACTIVITIES OF DAILY LIVING (ADL)
TOILETING_ISSUES: NO
HEARING_DIFFICULTY_OR_DEAF: NO
DIFFICULTY_EATING/SWALLOWING: YES
EQUIPMENT_CURRENTLY_USED_AT_HOME: CANE, STRAIGHT
FALL_HISTORY_WITHIN_LAST_SIX_MONTHS: YES
WALKING_OR_CLIMBING_STAIRS_DIFFICULTY: YES
WHICH_OF_THE_ABOVE_FUNCTIONAL_RISKS_HAD_A_RECENT_ONSET_OR_CHANGE?: COGNITION
DOING_ERRANDS_INDEPENDENTLY_DIFFICULTY: YES
CONCENTRATING,_REMEMBERING_OR_MAKING_DECISIONS_DIFFICULTY: YES
DRESSING/BATHING_DIFFICULTY: NO
DIFFICULTY_COMMUNICATING: YES
WEAR_GLASSES_OR_BLIND: YES

## 2021-07-30 NOTE — ED PROVIDER NOTES
EMERGENCY DEPARTMENT PROGRESS NOTE    In summary, the patient is a 66-year-old male admitted to the hospital for a urinary tract infection and hypoxemia thought secondary to pneumonitis.  Ceftriaxone was used for antibiotic therapy.  Patient is currently boarding in the ED until a bed is available.  [facility] Gonzalez [facility] and he is boarding in the emergency department until a inpatient bed is available.      ED Course:   0250 Patient is signed out to me by Dr. Alma Gonzalez.   0630-Signed out at change of shift boarding in the ED awaiting a bed assignment.    Savannah Melvin MD  Emergency Medicine  [facility] EMERGENCY ROOM  [facility] [facility] [address]  [phone]  Dept: [phone]     Savannah Melvin MD  07/30/21 0705

## 2021-07-30 NOTE — ED NOTES
Call placed to the [facility] notification line.  No meds available at this time.  Notification line [phone].

## 2021-07-30 NOTE — H&P
"[facility] MEDICINE ADMISSION HISTORY AND PHYSICAL       Assessment & Plan      1. UTI -- Continue antibiotic - Rocephin. Not septic looking. Same issue when he was admitted at [facility]. History of BPH   2. Has right foot pain for \"months\" - no fracture on XR. And RLE US showed no DVT  3. Encephalopathy -- He is awake and interactive. Head CT was negative. Last admission in June 20, 2021 at [facility] - he has been reported encephalopathic difficult to arouse in the mornings - that time, they stopped he melatonin and zyprexa and dosing of baclofen, YOHAN, depakote were reduced.   4. Has history of CVA with right sided residual weakness, some aphasia, and history of epilepsys  5. On chronic marijuana use per records - unclear indications  But records indicate chronic right knee and hip pain   6. HTN   7. History of Pulmonary fibrosis - prior imagings ? ILD. Our chest imaging here showed pneumonitis -- but patient doesn't have any respiratory complaints.   8. Also noted on imaging a small splenic infarct --- patient is not complaining of pain on the left abdomen or flank. Lactic acid is normal.  Consider referral to Hematology if remains a concern, will discuss with AM doc       VTE prophylaxis: SCDs and ambulation, heparin   IV fluids: Per order set   Diet:  Regular   GI prophylaxis: Not indicated at this point, re-assess if needed.   Pain, sleep, and vomiting medications: Per order set  Code Status: Full   COVID test result:  negative   COVID vaccination: unknown   Barriers to discharge: admitting clinical condition  Discharge Disposition and goals:  Unable to determine at this point, pending clinical progress and response to treatment. Patient may need transfer to SNF or [facility] if unsafe to go home and needed treatment inappropriate at home setting OR may need home health care evaluation if care can be delivered at home settings. Consider referral to care manager/        Care plan " was created based on available information provided, including patient's condition at the time of encounter.   This plan was discussed with patient and/or family members using layman's terms and have agreed to proceed.   At the end of night shift (9PM - 730A), this case will be presented to the AM Hospitalist.    It is recommended to revise care plan if there is change in condition and/or new clinical information that are not available during my encounter.     All or some of home medication/s were not resumed on admission due to safety reasons or contraindications. Dosing and frequency may also have been modified. Please resume/review them during your visit.     70 minutes of total visit duration and greater than 50% was spent in direct evaluation of patient and coordination of care including discussion of diagnostic test results and recommended treatment. .      Lakhwinder Gates MD, MPH, FACP, [facility]  Internal Medicine - Hospitalist        Chief Complaint Right foot pain      HISTORY       - Met him in the ED. He was awake and interactive. Cant give full details why he ended up in the hospital  - His only concern was right foot pain. He denies CP or SOB. He has no fever. He has no abdominal pain but reports pain when peeing. No diarrhea. No testicular pain.   - ED/EMS reports -- some fevers, testicular pain for 10 days. Was admitted  At [facility] 3rd week of June foe encephalopathy that was not clear in terms of etiology. It was also reported he had UTI back then. He was sent to TCU and then was moved back to home. There was some reports of fall recently too but patient cant tell.   - Of note, patient had prior history of CVA with residual aphasia and right hand contracture,  - In the ED, multiple imagings were done, see results. He was found to have UTI and was started on antibiotic.   - ROS --- No dizziness. No weakness. No CP or SOB. No palpitations. No abdominal pain. No nausea or vomiting. No bleeding  symptoms. No weight loss. Rest of 12 point ROS was reviewed and negative.       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  EEG 12/9/15     Altered mental status 12/08/2015   Seizure disorder 12/08/2015   Fuchs' corneal dystrophy 03/07/2012   Aphasia 05/26/2011   Compression fracture of L1 lumbar vertebra 05/26/2011   Late effects of CVA (cerebrovascular accident) 05/26/2011   Constipation 04/28/2011   Essential hypertension     Overview:     Formatting of this note might be different from the original.  Epic      Tobacco use disorder     Depressive disorder     Overview:     Formatting of this note might be different from the original.  DEPRESSIVE DISORDER, NOT ELSEWHERE CLASSIFIED           Surgical History     Past Surgical History:   Procedure Laterality Date     IR MISCELLANEOUS PROCEDURE  3/18/2009     IR MISCELLANEOUS PROCEDURE  3/20/2009        Family History      No family history on file.      Social History      .  Social History     Socioeconomic History     Marital status: Single     Spouse name: Not on file     Number of children: Not on file     Years of education: Not on file     Highest education level: Not on file   Occupational History     Not on file   Tobacco Use     Smoking status: Not on file   Substance and Sexual Activity     Alcohol use: Not on file     Drug use: Not on file     Sexual activity: Not on file   Other Topics Concern     Not on file   Social History Narrative     Not on file     Social Determinants of Health     Financial Resource Strain:      Difficulty of Paying Living Expenses:    Food Insecurity:      Worried About Running Out of Food in the Last Year:      Ran Out of Food in the Last Year:    Transportation Needs:      Lack of Transportation (Medical):      Lack of Transportation (Non-Medical):    Physical Activity:      Days of Exercise per Week:      Minutes of Exercise per Session:     Stress:      Feeling of Stress :    Social Connections:      Frequency of Communication with Friends and Family:      Frequency of Social Gatherings with Friends and Family:      Attends [employer] Services:      Active Member of Clubs or Organizations:      Attends Club or Organization Meetings:      Marital Status:    Intimate Partner Violence:      Fear of Current or Ex-Partner:      Emotionally Abused:      Physically Abused:      Sexually Abused:           Allergies      No Known Allergies      Prior to Admission Medications      No current facility-administered medications on file prior to encounter.  acetaminophen (TYLENOL) 325 MG tablet, Take 650 mg by mouth every 6 hours as needed  alendronate (FOSAMAX) 70 MG tablet, Take 70 mg by mouth once a week , first thing with water only. Do not eat or drink for 30 min and remain upright for 30 min  alfuzosin ER (UROXATRAL) 10 MG 24 hr tablet, Take 10 mg by mouth daily  aspirin (ASA) 325 MG tablet, Take 325 mg by mouth daily  atorvastatin (LIPITOR) 80 MG tablet, Take 40 mg by mouth At Bedtime   [] baclofen (LIORESAL) 20 MG tablet, Take 20 mg by mouth 3 times daily  calcium citrate-vitamin D (CITRACAL) 315-200 MG-UNIT TABS per tablet, Take 2 tablets by mouth daily   cholecalciferol 25 MCG (1000 UT) TABS, Take 50 mcg by mouth daily   diclofenac (VOLTAREN) 1 % topical gel, Apply 4 g topically 4 times daily as needed To back and right leg  divalproex sodium extended-release (DEPAKOTE ER) 500 MG 24 hr tablet, Take 1,000 mg by mouth every morning  divalproex sodium extended-release (DEPAKOTE ER) 500 MG 24 hr tablet, Take 1,500 mg by mouth At Bedtime  gabapentin (NEURONTIN) 300 MG capsule, Take 300 mg by mouth 3 times daily  ibuprofen (ADVIL/MOTRIN) 400 MG tablet, Take 400 mg by mouth 3 times daily as needed  lisinopril (ZESTRIL) 40 MG tablet, Take 30 mg by mouth daily   multivitamin (DEKAS ESSENTIAL) capsule, Take 1 capsule by mouth daily  omeprazole  "(PRILOSEC) 20 MG DR capsule, Take 20 mg by mouth every morning 30 min prior to meal  senna-docusate (SENOKOT-S/PERICOLACE) 8.6-50 MG tablet, Take 2 tablets by mouth 2 times daily as needed  vitamin C (ASCORBIC ACID) 500 MG tablet, Take 500 mg by mouth daily            Review of Systems     A 12 point comprehensive review of systems was negative except as noted above in HPI.    PHYSICAL EXAMINATION       Vitals      Vitals: /70   Pulse 72   Temp 99  F (37.2  C) (Oral)   Resp 27   Ht 1.778 m (5' 10\")   Wt 95.3 kg (210 lb)   SpO2 95%   BMI 30.13 kg/m    BMI= Body mass index is 30.13 kg/m .      Examination     General Appearance:  Alert, cooperative, no distress  Head:    Normocephalic, without obvious abnormality, atraumatic  EENT:  PERRL, conjunctiva/corneas clear, EOM's intact.   Neck:   Supple, symmetrical, trachea midline, no adenopathy; no NVE  Back:  Symmetric, no curvature, no CVA tenderness  Chest/Lungs: Clear to auscultation bilaterally, respirations unlabored, No tenderness or deformity. No abdominal breathing or use of accessory muscles.   Heart:    Regular rate and rhythm, S1 and S2 normal, no murmur, rub   or gallop  Abdomen: Soft, non-tender, bowel sounds active all four quadrants, not peritoneal on palpation. Not distended  Extremities:  Extremities normal, atraumatic, no swelling   Skin:  Skin color, texture, turgor normal, no rashes or lesion  Neurologic:  Awake and alert, Moves all extremities. No posturing. No preferential gaze.       Pertinent Lab     Results for orders placed or performed during the hospital encounter of 07/29/21   Foot  XR, G/E 3 views, right    Impression    IMPRESSION:   Moderate hallux valgus. Diffuse osteopenia. No evidence of acute fracture. Mild first MTP joint degenerative changes.   CT Chest Pulmonary Embolism w Contrast    Impression    IMPRESSION:  1.  Negative for pulmonary embolism. Enlargement of the central pulmonary arteries suggest pulmonary arterial " hypertension.    2.  Interstitial fibrotic change in the periphery of the lungs.    3.  Mild patchy scattered groundglass opacities in the periphery of the lungs are nonspecific and could be seen with a mild or low-grade pneumonitis. Clinical correlation.    4.  Mild bilateral hilar and mediastinal adenopathy. This may be reactive in nature but follow-up CT in 3 months is recommended for reevaluation to exclude any progressive process.    5.  Marked coronary artery calcification.   CT Abdomen Pelvis w Contrast    Impression    IMPRESSION:   1.  Focal area of peripheral decreased enhancement in the spleen medially is concerning for a small acute splenic infarct.  2.  Partially exophytic complex cyst in the right kidney lower pole measuring 6.2 cm with multiple septations. A cystic renal cell neoplasm cannot be excluded. An MRI of the kidneys without and with IV gadolinium is recommended in the near future.  3.  Multiple low-attenuation lesions in the liver. These may represent benign cysts and can be reassessed at the time of the renal MRI.  4.  Moderate changes of pulmonary fibrosis in both lung bases.  5.  Diffuse atheromatous plaque in the abdominal aorta.   Head CT w/o contrast    Impression    IMPRESSION:  1.  No acute intracranial process.   US Lower Extremity Venous Duplex Right    Impression    IMPRESSION:  1.  No deep venous thrombosis in the right lower extremity.   UA with Microscopic reflex to Culture    Specimen: Urine, Clean Catch   Result Value Ref Range    Color Urine Yellow Colorless, Straw, Light Yellow, Yellow    Appearance Urine Turbid (A) Clear    Glucose Urine Negative Negative mg/dL    Bilirubin Urine Negative Negative    Ketones Urine Negative Negative mg/dL    Specific Gravity Urine 1.016 1.001 - 1.030    Blood Urine 0.1 mg/dL (A) Negative    pH Urine 7.5 (H) 5.0 - 7.0    Protein Albumin Urine 30  (A) Negative mg/dL    Urobilinogen Urine <2.0 <2.0 mg/dL    Nitrite Urine Positive (A) Negative     Leukocyte Esterase Urine 500 Juju/uL (A) Negative    Bacteria Urine Few (A) None Seen /HPF    WBC Clumps Urine Present (A) None Seen /HPF    Mucus Urine Present (A) None Seen /LPF    RBC Urine 34 (H) <=2 /HPF    WBC Urine >182 (H) <=5 /HPF    Squamous Epithelials Urine <1 <=1 /HPF   Comprehensive metabolic panel   Result Value Ref Range    Sodium 136 136 - 145 mmol/L    Potassium 3.9 3.5 - 5.0 mmol/L    Chloride 103 98 - 107 mmol/L    Carbon Dioxide (CO2) 23 22 - 31 mmol/L    Anion Gap 10 5 - 18 mmol/L    Urea Nitrogen 21 8 - 22 mg/dL    Creatinine 0.76 0.70 - 1.30 mg/dL    Calcium 8.8 8.5 - 10.5 mg/dL    Glucose 87 70 - 125 mg/dL    Alkaline Phosphatase 57 45 - 120 U/L    AST 22 0 - 40 U/L    ALT 14 0 - 45 U/L    Protein Total 6.6 6.0 - 8.0 g/dL    Albumin 2.7 (L) 3.5 - 5.0 g/dL    Bilirubin Total 0.5 0.0 - 1.0 mg/dL    GFR Estimate >90 >60 mL/min/1.73m2   Lactic acid whole blood   Result Value Ref Range    Lactic Acid 0.8 0.7 - 2.0 mmol/L   CBC with platelets and differential   Result Value Ref Range    WBC Count 5.2 4.0 - 11.0 10e3/uL    RBC Count 3.57 (L) 4.40 - 5.90 10e6/uL    Hemoglobin 11.8 (L) 13.3 - 17.7 g/dL    Hematocrit 35.1 (L) 40.0 - 53.0 %    MCV 98 78 - 100 fL    MCH 33.1 (H) 26.5 - 33.0 pg    MCHC 33.6 31.5 - 36.5 g/dL    RDW 14.8 10.0 - 15.0 %    Platelet Count 145 (L) 150 - 450 10e3/uL    % Neutrophils 70 %    % Lymphocytes 16 %    % Monocytes 13 %    % Eosinophils 0 %    % Basophils 1 %    % Immature Granulocytes 0 %    NRBCs per 100 WBC 0 <1 /100    Absolute Neutrophils 3.6 1.6 - 8.3 10e3/uL    Absolute Lymphocytes 0.8 0.8 - 5.3 10e3/uL    Absolute Monocytes 0.7 0.0 - 1.3 10e3/uL    Absolute Eosinophils 0.0 0.0 - 0.7 10e3/uL    Absolute Basophils 0.1 0.0 - 0.2 10e3/uL    Absolute Immature Granulocytes 0.0 <=0.0 10e3/uL    Absolute NRBCs 0.0 10e3/uL   SARS-COV2 (COVID-19) Virus RT-PCR    Specimen: Nasopharyngeal; Swab   Result Value Ref Range    SARS CoV2 PCR Negative Negative            Pertinent Radiology

## 2021-07-30 NOTE — ED NOTES
"P pressed call light due to headache states the pain is \"real bad\" but could not give me a number on the pain scale. Pt is grimacing and looks uncomfortable. Provider paged. Due to no other pain medications ordered. And too soon to give more tylenol.     "

## 2021-07-30 NOTE — ED NOTES
Pt concerned that the external catheter was not placed correctly anymore.  Writer verified correct placement and pt made aware.  Pt c/o being cold.  Warming blanket applied.  Lights turned back down for comfort.  Call light within reach.  Side rails up.  Pt has no other needs at this time.

## 2021-07-30 NOTE — PROGRESS NOTES
"[facility] Medicine PROGRESS NOTE      Identification/Summary: Goldy Peraza is 66 year old male with past medical history of CVA with right-sided weakness, expressive aphasia, hypertension depression who was admitted on 7/29/2021 for UTI, acute encephalopathy.  On ceftriaxone.    Assessment and Plan:  1. UTI -- Continue antibiotic - Rocephin.  History of BPH   6.2cm complex right kidney cyst On CT   Check bladder scan  Follow-up on urine cultures  Urology consult for cyst    2. Has right foot pain for \"months\" - no fracture on XR. And RLE US showed no DVT  3.  Acute metabolic encephalopathy -- He is awake and interactive. Head CT was negative. Last admission in June 20, 2021 at [facility] - he has been reported encephalopathic difficult to arouse in the mornings - that time, they stopped he melatonin and zyprexa and dosing of baclofen, YOHAN, depakote were reduced.   Holding parameters written for gabapentin, baclofen  No new focal neurological deficits    4. history of CVA with right sided residual weakness, some aphasia, and history of epilepsy  On aspirin, statin, baclofen  5. On chronic marijuana use per records - unclear indications  But records indicate chronic right knee and hip pain   6. HTN   7. History of Pulmonary fibrosis - prior imagings ? ILD. Our chest imaging here showed pneumonitis -- but patient doesn't have any respiratory complaints.   Has mediastinal hilar adenopathy on CT  Will need repeat imaging in 2 months    8.small splenic infarct --- patient is not complaining of pain on the left abdomen or flank. Lactic acid is normal.  Follow-up with hematology as outpatient    Thrombocytopenia  Monitor      Diet: Combination Diet Regular Diet Adult  DVT Prophylaxis:  LOVENOX  Code Status: No Order    Anticipated possible discharge in 1 days once URINE CULTURE milestones are met.    Interval History/Subjective:  Complains of chronic pain.  No other shortness of breath, chest pain.  He has lower " "abdominal pain.  No back pain or flank pain.  No nausea vomiting.     Physical Exam/Objective:  Vitals I/O   Vital signs:  Temp: 99  F (37.2  C) Temp src: Oral BP: 137/74 Pulse: 70   Resp: 27 SpO2: 97 % O2 Device: Nasal cannula Oxygen Delivery: 2 LPM Height: 177.8 cm (5' 10\") Weight: 95.3 kg (210 lb) (previous weight)  Estimated body mass index is 30.13 kg/m  as calculated from the following:    Height as of this encounter: 1.778 m (5' 10\").    Weight as of this encounter: 95.3 kg (210 lb).       I/O last 3 completed shifts:  In: 1050 [IV Piggyback:1050]  Out: 50 [Urine:50]     Body mass index is 30.13 kg/m .    General Appearance:  Alert, cooperative, no distress   Head:  Normocephalic, atraumatic   Eyes:  PERRL    Throat:  mucosa; moist   Neck: No JVD, thyromegaly   Lungs:    Rales at the bases bilaterally, respirations unlabored   Chest Wall:  No tenderness or deformity   Heart:  Regular rate and rhythm, S1, S2 normal,no murmur   Abdomen:   Soft, tender in suprapubic area, non distended, bowel sounds present, no guarding or rigidity   Extremities:  Trace edema in both lower extremities, no joint swelling   Skin: Skin color, texture, turgor normal, no rashes or lesions   Neurologic: Alert and oriented X 3, chronic right-sided weakness       Medications:   Personally Reviewed.    alfuzosin ER  10 mg Oral Daily     aspirin  325 mg Oral Daily     atorvastatin  40 mg Oral At Bedtime     Baclofen  20 mg Oral TID     calcium carbonate-vitamin D  2 tablet Oral Daily     cefTRIAXone  1 g Intravenous Q24H     divalproex sodium extended-release  1,000 mg Oral QAM     enoxaparin ANTICOAGULANT  40 mg Subcutaneous Q24H     gabapentin  300 mg Oral TID     lisinopril  30 mg Oral Daily     multivitamin w/minerals  1 tablet Oral Daily     pantoprazole  40 mg Oral QAM AC     sodium chloride (PF)  3 mL Intracatheter Q8H     sodium chloride (PF)  3 mL Intracatheter Q8H     vitamin C  500 mg Oral Daily     cholecalciferol  25 mcg Oral " Daily       Data reviewed today: I personally reviewed all new medications, labs, imaging/diagnostics reports over the past 24 hours. Pertinent findings include    Labs:  Most Recent 3 CBC's:Recent Labs   Lab Test 07/30/21 0928 07/29/21 1835 07/19/21  0755 07/13/21  0645   WBC 4.7 5.2  --  7.8   HGB 11.6* 11.8* 12.9* 12.5*    98  --  102*   * 145*  --  473*     Most Recent 3 BMP's:Recent Labs   Lab Test 07/30/21 0928 07/29/21  1835 07/13/21  0645    136 140   POTASSIUM 3.9  3.9 3.9 4.8   CHLORIDE 105 103 103   CO2 23 23 23   BUN 17 21 13   CR 0.70 0.76 0.68*   ANIONGAP 9 10 14   ELMA 8.4* 8.8 9.2   GLC 74 87 80     Most Recent 6 Bacteria Isolates From Any Culture (See EPIC Reports for Culture Details):No lab results found.  Most Recent TSH and T4:Recent Labs   Lab Test 07/13/21  0645   TSH 2.48       Imaging:   Recent Results (from the past 24 hour(s))   Foot  XR, G/E 3 views, right    Narrative    EXAM: XR FOOT RIGHT G/E 3 VIEWS  LOCATION: [facility]  DATE/TIME: 7/29/2021 8:20 PM    INDICATION: Right foot pain.  COMPARISON: None.      Impression    IMPRESSION:   Moderate hallux valgus. Diffuse osteopenia. No evidence of acute fracture. Mild first MTP joint degenerative changes.   Head CT w/o contrast    Narrative    EXAM: CT HEAD W/O CONTRAST  LOCATION: [facility]  DATE/TIME: 7/29/2021 7:59 PM    INDICATION: Trauma, headache  COMPARISON: MRI brain 6/22/2021  TECHNIQUE: Routine CT Head without IV contrast. Multiplanar reformats. Dose reduction techniques were used.    FINDINGS:  INTRACRANIAL CONTENTS: No intracranial hemorrhage, extraaxial collection, or mass effect.  No CT evidence of acute infarct. Unchanged mild presumed chronic small vessel ischemic changes. Unchanged moderate generalized volume loss. No hydrocephalus.   Unchanged left MCA territory encephalomalacia.    VISUALIZED ORBITS/SINUSES/MASTOIDS: No intraorbital abnormality. No  paranasal sinus mucosal disease. No middle ear or mastoid effusion.    BONES/SOFT TISSUES: No acute abnormality.      Impression    IMPRESSION:  1.  No acute intracranial process.   CT Chest Pulmonary Embolism w Contrast    Narrative    EXAM: CT CHEST PULMONARY EMBOLISM W CONTRAST  LOCATION: [facility]  DATE/TIME: 7/29/2021 7:59 PM    INDICATION: Altered mental status, fever.  COMPARISON: None.  TECHNIQUE: CT chest pulmonary angiogram during arterial phase injection of IV contrast. Multiplanar reformats and MIP reconstructions were performed. Dose reduction techniques were used.   CONTRAST: [id number]    FINDINGS:  ANGIOGRAM CHEST: Exam is negative for pulmonary embolism. Enlargement of the central pulmonary arteries can be seen with pulmonary arterial hypertension. Thoracic aorta is negative for dissection. No CT evidence of right heart strain.    LUNGS AND PLEURA: There are mild to moderate interstitial fibrotic changes in the periphery of the lungs. No honeycombing. Mild scattered patchy areas of groundglass opacity in the periphery of the lungs with a mild or low-grade superimposed pneumonitis   not excluded. Clinical correlation. Numerous small calcified granulomas in the lungs. No pleural effusions.    MEDIASTINUM/AXILLAE: There are a few nonspecific mildly enlarged bilateral hilar and mediastinal lymph nodes. These may be reactive in nature but suggest follow-up to exclude any progressive process. One of the larger lymph nodes measure 1.7 cm in short   axis diameter in the AP window on series 7 image 96. Normal heart size. No pericardial effusion. Normal caliber thoracic aorta. Esophagus is grossly negative.    CORONARY ARTERY CALCIFICATION: Severe.    UPPER ABDOMEN: Please see separate abdomen and pelvis CT report for those findings.    MUSCULOSKELETAL: Degenerative changes both shoulders and in the lower cervical and thoracic spine. Mild chronic appearing compression fracture  superior endplate of the T8 vertebral body. Old right rib fracture.      Impression    IMPRESSION:  1.  Negative for pulmonary embolism. Enlargement of the central pulmonary arteries suggest pulmonary arterial hypertension.    2.  Interstitial fibrotic change in the periphery of the lungs.    3.  Mild patchy scattered groundglass opacities in the periphery of the lungs are nonspecific and could be seen with a mild or low-grade pneumonitis. Clinical correlation.    4.  Mild bilateral hilar and mediastinal adenopathy. This may be reactive in nature but follow-up CT in 3 months is recommended for reevaluation to exclude any progressive process.    5.  Marked coronary artery calcification.   CT Abdomen Pelvis w Contrast    Narrative    EXAM: CT ABDOMEN PELVIS W CONTRAST  LOCATION: [facility]  DATE/TIME: 7/29/2021 8:00 PM    INDICATION: Abdominal pain, acute, nonlocalized. Diffuse abdominal pain.  COMPARISON: None.  TECHNIQUE: CT scan of the abdomen and pelvis was performed following injection of IV contrast. Multiplanar reformats were obtained. Dose reduction techniques were used.  CONTRAST: [id number]    FINDINGS:   LOWER CHEST: Moderate fibrosis in both lung bases.    HEPATOBILIARY: 4.2 cm benign cyst in the left hepatic lobe. Additional smaller lower attenuation lesions are noted as well characterize but may represent additional cysts. See impression.    PANCREAS: Normal.    SPLEEN: Focal area of peripheral enhancement in the spleen medially is concerning for a small acute infarct.    ADRENAL GLANDS: Normal.    KIDNEYS/BLADDER: Complex septated 6.2 cm cyst in the right kidney lower pole. A few additional benign simple cysts in both kidneys. No ureteral stones or hydronephrosis. Diffuse bladder wall thickening likely due to hypertrophy.    BOWEL: The stomach is decompressed. The small and large intestines are normal caliber. Normal appendix.    LYMPH NODES: Normal.    VASCULATURE:  Moderate atheromatous plaque in the abdominal aorta with mild ectasia.    PELVIC ORGANS: Mild enlargement the prostate gland.    MUSCULOSKELETAL: Right hip replacement. The bones are demineralized. There are compression deformities of T9, L1, L2, and L3 which are of indeterminate age. Bilateral L5 spondylolysis with grade 1 spondylolisthesis. Degenerative disc disease at the   lumbosacral interspace. Facet arthritis in the lumbar spine.      Impression    IMPRESSION:   1.  Focal area of peripheral decreased enhancement in the spleen medially is concerning for a small acute splenic infarct.  2.  Partially exophytic complex cyst in the right kidney lower pole measuring 6.2 cm with multiple septations. A cystic renal cell neoplasm cannot be excluded. An MRI of the kidneys without and with IV gadolinium is recommended in the near future.  3.  Multiple low-attenuation lesions in the liver. These may represent benign cysts and can be reassessed at the time of the renal MRI.  4.  Moderate changes of pulmonary fibrosis in both lung bases.  5.  Diffuse atheromatous plaque in the abdominal aorta.   US Lower Extremity Venous Duplex Right    Narrative    EXAM: US LOWER EXTREMITY VENOUS DUPLEX RIGHT  LOCATION: [facility]  DATE/TIME: 7/29/2021 10:41 PM    INDICATION: Right leg swelling.  COMPARISON: None.  TECHNIQUE: Venous Duplex ultrasound of the right lower extremity with and without compression, augmentation and duplex. Color flow and spectral Doppler with waveform analysis performed.    FINDINGS: Exam includes the common femoral, femoral, popliteal, and contralateral common femoral veins as well as segmentally visualized deep calf veins and greater saphenous vein.     RIGHT: No deep vein thrombosis. No superficial thrombophlebitis. No popliteal cyst.      Impression    IMPRESSION:  1.  No deep venous thrombosis in the right lower extremity.         Reshma Agarwal MD MD   Hospitalist  [facility]  [facility]

## 2021-07-30 NOTE — ED NOTES
Pt c/o right sided pain that is chronic. Hospitalist at bedside. Medicated per MAR. MD notified of need for further pain meds. Lunch ordered. Care transferred to Jenny VELEZ

## 2021-07-30 NOTE — PHARMACY-ADMISSION MEDICATION HISTORY
Pharmacy Note - Admission Medication History    Pertinent Provider Information: Obtained pt's med list from family in the room     ______________________________________________________________________    Prior To Admission (PTA) med list completed and updated in EMR.       PTA Med List   Medication Sig Last Dose     acetaminophen (TYLENOL) 325 MG tablet Take 650 mg by mouth every 6 hours as needed Unknown     alendronate (FOSAMAX) 70 MG tablet Take 70 mg by mouth once a week Sunday mornings, first thing with water only. Do not eat or drink for 30 min and remain upright for 30 min Past Week at Unknown time     alfuzosin ER (UROXATRAL) 10 MG 24 hr tablet Take 10 mg by mouth daily 7/28/2021 at Unknown time     aspirin (ASA) 325 MG tablet Take 325 mg by mouth daily 7/29/2021 at Unknown time     atorvastatin (LIPITOR) 80 MG tablet Take 40 mg by mouth At Bedtime  7/28/2021 at Unknown time     baclofen (LIORESAL) 20 MG tablet Take 20 mg by mouth 3 times daily 7/29/2021 at Unknown time     calcium citrate-vitamin D (CITRACAL) 315-200 MG-UNIT TABS per tablet Take 2 tablets by mouth daily  7/29/2021 at Unknown time     cholecalciferol 25 MCG (1000 UT) TABS Take 50 mcg by mouth daily  7/29/2021 at Unknown time     diclofenac (VOLTAREN) 1 % topical gel Apply 4 g topically 4 times daily as needed To back and right leg Unknown     divalproex sodium extended-release (DEPAKOTE ER) 500 MG 24 hr tablet Take 1,000 mg by mouth every morning 7/29/2021 at Unknown time     divalproex sodium extended-release (DEPAKOTE ER) 500 MG 24 hr tablet Take 1,500 mg by mouth At Bedtime 7/28/2021 at Unknown time     gabapentin (NEURONTIN) 300 MG capsule Take 300 mg by mouth 3 times daily 7/29/2021 at Unknown time     ibuprofen (ADVIL/MOTRIN) 400 MG tablet Take 400 mg by mouth 3 times daily as needed Unknown     lisinopril (ZESTRIL) 40 MG tablet Take 30 mg by mouth daily  7/29/2021 at Unknown time     multivitamin (DEKAS ESSENTIAL) capsule Take 1  capsule by mouth daily 7/29/2021 at Unknown time     omeprazole (PRILOSEC) 20 MG DR capsule Take 20 mg by mouth every morning 30 min prior to meal 7/29/2021 at Unknown time     senna-docusate (SENOKOT-S/PERICOLACE) 8.6-50 MG tablet Take 2 tablets by mouth 2 times daily as needed 7/29/2021 at Unknown time     vitamin C (ASCORBIC ACID) 500 MG tablet Take 500 mg by mouth daily 7/29/2021 at Unknown time       Information source(s): Facility ([facility]/) medication list/MAR and CareEverywhere/SureScripts  Method of interview communication: N/A    Summary of Changes to PTA Med List  New: n/a  Discontinued: n/a  Changed: n/a    Patient was asked about OTC/herbal products specifically.  PTA med list reflects this.    In the past week, patient estimated taking medication this percent of the time:  greater than 90%.    Allergies were reviewed, assessed, and updated with the patient.      Patient does not use any multi-dose medications prior to admission.    The information provided in this note is only as accurate as the sources available at the time of the update(s).    Thank you for the opportunity to participate in the care of this patient.    Sondra Michel PharmD  7/29/2021 8:58 PM

## 2021-07-30 NOTE — ED NOTES
Pt's sats decreased to 88% on RA. Placed on oxygen at 2L/NC, sats increased to 94%. Pt c/o feeling cold. Temp rechecked, 99.0F. Socks placed on pt d/t c/o feeling cold. Provider updated. Visitor at bedside.

## 2021-07-31 ENCOUNTER — APPOINTMENT (OUTPATIENT)
Dept: PHYSICAL THERAPY | Facility: CLINIC | Age: 66
DRG: 689 | End: 2021-07-31
Attending: INTERNAL MEDICINE
Payer: MEDICARE

## 2021-07-31 ENCOUNTER — APPOINTMENT (OUTPATIENT)
Dept: OCCUPATIONAL THERAPY | Facility: CLINIC | Age: 66
DRG: 689 | End: 2021-07-31
Attending: INTERNAL MEDICINE
Payer: MEDICARE

## 2021-07-31 LAB
ANION GAP SERPL CALCULATED.3IONS-SCNC: 6 MMOL/L (ref 5–18)
BACTERIA UR CULT: ABNORMAL
BUN SERPL-MCNC: 15 MG/DL (ref 8–22)
CALCIUM SERPL-MCNC: 8.1 MG/DL (ref 8.5–10.5)
CHLORIDE BLD-SCNC: 109 MMOL/L (ref 98–107)
CO2 SERPL-SCNC: 23 MMOL/L (ref 22–31)
CREAT SERPL-MCNC: 0.75 MG/DL (ref 0.7–1.3)
ERYTHROCYTE [DISTWIDTH] IN BLOOD BY AUTOMATED COUNT: 14.6 % (ref 10–15)
GFR SERPL CREATININE-BSD FRML MDRD: >90 ML/MIN/1.73M2
GLUCOSE BLD-MCNC: 80 MG/DL (ref 70–125)
HCT VFR BLD AUTO: 35.5 % (ref 40–53)
HGB BLD-MCNC: 11.7 G/DL (ref 13.3–17.7)
MAGNESIUM SERPL-MCNC: 1.8 MG/DL (ref 1.8–2.6)
MCH RBC QN AUTO: 33.1 PG (ref 26.5–33)
MCHC RBC AUTO-ENTMCNC: 33 G/DL (ref 31.5–36.5)
MCV RBC AUTO: 101 FL (ref 78–100)
PLATELET # BLD AUTO: 138 10E3/UL (ref 150–450)
POTASSIUM BLD-SCNC: 4 MMOL/L (ref 3.5–5)
RBC # BLD AUTO: 3.53 10E6/UL (ref 4.4–5.9)
SODIUM SERPL-SCNC: 138 MMOL/L (ref 136–145)
WBC # BLD AUTO: 5.2 10E3/UL (ref 4–11)

## 2021-07-31 PROCEDURE — 99207 PR CDG-MDM COMPONENT: MEETS MODERATE - DOWN CODED: CPT | Performed by: INTERNAL MEDICINE

## 2021-07-31 PROCEDURE — 97162 PT EVAL MOD COMPLEX 30 MIN: CPT | Mod: GP

## 2021-07-31 PROCEDURE — 97110 THERAPEUTIC EXERCISES: CPT | Mod: GP

## 2021-07-31 PROCEDURE — 85027 COMPLETE CBC AUTOMATED: CPT | Performed by: INTERNAL MEDICINE

## 2021-07-31 PROCEDURE — 250N000013 HC RX MED GY IP 250 OP 250 PS 637: Performed by: INTERNAL MEDICINE

## 2021-07-31 PROCEDURE — 83735 ASSAY OF MAGNESIUM: CPT | Performed by: INTERNAL MEDICINE

## 2021-07-31 PROCEDURE — 120N000001 HC R&B MED SURG/OB

## 2021-07-31 PROCEDURE — 80048 BASIC METABOLIC PNL TOTAL CA: CPT | Performed by: INTERNAL MEDICINE

## 2021-07-31 PROCEDURE — 250N000013 HC RX MED GY IP 250 OP 250 PS 637: Performed by: EMERGENCY MEDICINE

## 2021-07-31 PROCEDURE — 97530 THERAPEUTIC ACTIVITIES: CPT | Mod: GP

## 2021-07-31 PROCEDURE — 97163 PT EVAL HIGH COMPLEX 45 MIN: CPT | Mod: GP

## 2021-07-31 PROCEDURE — 99232 SBSQ HOSP IP/OBS MODERATE 35: CPT | Performed by: INTERNAL MEDICINE

## 2021-07-31 PROCEDURE — 97535 SELF CARE MNGMENT TRAINING: CPT | Mod: GO

## 2021-07-31 PROCEDURE — 36415 COLL VENOUS BLD VENIPUNCTURE: CPT | Performed by: INTERNAL MEDICINE

## 2021-07-31 PROCEDURE — 250N000011 HC RX IP 250 OP 636: Performed by: INTERNAL MEDICINE

## 2021-07-31 PROCEDURE — 258N000003 HC RX IP 258 OP 636: Performed by: INTERNAL MEDICINE

## 2021-07-31 PROCEDURE — 97167 OT EVAL HIGH COMPLEX 60 MIN: CPT | Mod: GO

## 2021-07-31 RX ADMIN — BACLOFEN 20 MG: 10 TABLET ORAL at 20:55

## 2021-07-31 RX ADMIN — DIVALPROEX SODIUM 1500 MG: 500 TABLET, EXTENDED RELEASE ORAL at 21:01

## 2021-07-31 RX ADMIN — ATORVASTATIN CALCIUM 40 MG: 40 TABLET, FILM COATED ORAL at 21:01

## 2021-07-31 RX ADMIN — PANTOPRAZOLE SODIUM 40 MG: 20 TABLET, DELAYED RELEASE ORAL at 09:58

## 2021-07-31 RX ADMIN — Medication 25 MCG: at 09:58

## 2021-07-31 RX ADMIN — CEFTRIAXONE 1 G: 1 INJECTION, POWDER, FOR SOLUTION INTRAMUSCULAR; INTRAVENOUS at 18:57

## 2021-07-31 RX ADMIN — GABAPENTIN 300 MG: 300 CAPSULE ORAL at 13:14

## 2021-07-31 RX ADMIN — SODIUM CHLORIDE: 9 INJECTION, SOLUTION INTRAVENOUS at 13:14

## 2021-07-31 RX ADMIN — ASPIRIN 325 MG ORAL TABLET 325 MG: 325 PILL ORAL at 09:57

## 2021-07-31 RX ADMIN — OXYCODONE HYDROCHLORIDE AND ACETAMINOPHEN 500 MG: 500 TABLET ORAL at 09:57

## 2021-07-31 RX ADMIN — ENOXAPARIN SODIUM 40 MG: 100 INJECTION SUBCUTANEOUS at 10:02

## 2021-07-31 RX ADMIN — LISINOPRIL 30 MG: 20 TABLET ORAL at 09:57

## 2021-07-31 RX ADMIN — ACETAMINOPHEN 650 MG: 325 TABLET ORAL at 23:48

## 2021-07-31 RX ADMIN — GABAPENTIN 300 MG: 300 CAPSULE ORAL at 20:56

## 2021-07-31 RX ADMIN — Medication 2 TABLET: at 09:57

## 2021-07-31 RX ADMIN — DIVALPROEX SODIUM 1000 MG: 500 TABLET, EXTENDED RELEASE ORAL at 09:57

## 2021-07-31 RX ADMIN — GABAPENTIN 300 MG: 300 CAPSULE ORAL at 09:58

## 2021-07-31 RX ADMIN — MULTIPLE VITAMINS W/ MINERALS TAB 1 TABLET: TAB at 09:58

## 2021-07-31 RX ADMIN — ALFUZOSIN HYDROCHLORIDE 10 MG: 10 TABLET ORAL at 10:02

## 2021-07-31 RX ADMIN — BACLOFEN 20 MG: 10 TABLET ORAL at 13:13

## 2021-07-31 RX ADMIN — BACLOFEN 20 MG: 10 TABLET ORAL at 09:55

## 2021-07-31 RX ADMIN — ACETAMINOPHEN AND CODEINE PHOSPHATE 1 HALF-TAB: 300; 30 TABLET ORAL at 02:56

## 2021-07-31 NOTE — PROGRESS NOTES
"[facility] Medicine PROGRESS NOTE      Identification/Summary: Goldy Peraza is 66 year old male with past medical history of CVA with right-sided weakness, expressive aphasia, hypertension depression who was admitted on 7/29/2021 for UTI, acute encephalopathy.  On ceftriaxone.  Awaiting urine culture.    Assessment and Plan:  1. UTI --   T-max 102  F last night  Continue antibiotic - Rocephin.  History of BPH   6.2cm complex right kidney cyst On CT   bladder scan postvoid minimal.  Follow-up on urine cultures  Urology consulted for cyst    2. Has right foot pain for \"months\" - no fracture on XR. And RLE US showed no DVT  3.  Acute metabolic encephalopathy    Head CT was negative.  Holding parameters written for gabapentin, baclofen  No new focal neurological deficits    4. history of CVA with right sided residual weakness, some aphasia, and history of epilepsy  On aspirin, statin, baclofen  5. On chronic marijuana use per records - unclear indications  But records indicate chronic right knee and hip pain   6. HTN   7. History of Pulmonary fibrosis - prior imagings ? ILD. Our chest imaging here showed pneumonitis -- but patient doesn't have any respiratory complaints.   Has mediastinal hilar adenopathy on CT  Will need repeat imaging in 2 months    8.small splenic infarct --- patient is not complaining of pain on the left abdomen or flank. Lactic acid is normal.  Follow-up with hematology as outpatient    Thrombocytopenia  Monitor    Diet: Combination Diet Regular Diet Adult  DVT Prophylaxis:  LOVENOX  Code Status: Full Code    Anticipated possible discharge in 1 days once URINE CULTURE milestones are met.    Interval History/Subjective:  Sitting up in chair.  No other shortness of breath, chest pain.  He has lower abdominal pain.  No back pain or flank pain.  No nausea vomiting.     Physical Exam/Objective:  Vitals I/O   Vital signs:  Temp: 98.4  F (36.9  C) Temp src: Oral BP: 129/77 Pulse: 64   Resp: 18 " "SpO2: 96 % O2 Device: Nasal cannula Oxygen Delivery: 2 LPM Height: 177.8 cm (5' 10\") Weight: 95.3 kg (210 lb) (previous weight)  Estimated body mass index is 30.13 kg/m  as calculated from the following:    Height as of this encounter: 1.778 m (5' 10\").    Weight as of this encounter: 95.3 kg (210 lb).       I/O last 3 completed shifts:  In: 1976 [P.O.:1160; I.V.:816]  Out: 900 [Urine:900]     Body mass index is 30.13 kg/m .    General Appearance:  Alert, cooperative, no distress   Head:  Normocephalic, atraumatic   Eyes:  PERRL    Throat:  mucosa; moist   Neck: No JVD, thyromegaly   Lungs:    Rales at the bases bilaterally, respirations unlabored   Chest Wall:  No tenderness or deformity   Heart:  Regular rate and rhythm, S1, S2 normal,no murmur   Abdomen:   Soft, tender in suprapubic area, non distended, bowel sounds present, no guarding or rigidity   Extremities:  Trace edema in both lower extremities, no joint swelling   Skin: Skin color, texture, turgor normal, no rashes or lesions   Neurologic: Alert and oriented X 3, chronic right-sided weakness       Medications:   Personally Reviewed.    alfuzosin ER  10 mg Oral Daily     aspirin  325 mg Oral Daily     atorvastatin  40 mg Oral At Bedtime     Baclofen  20 mg Oral TID     calcium carbonate-vitamin D  2 tablet Oral Daily     cefTRIAXone  1 g Intravenous Q24H     divalproex sodium extended-release  1,000 mg Oral QAM     divalproex sodium extended-release  1,500 mg Oral At Bedtime     enoxaparin ANTICOAGULANT  40 mg Subcutaneous Q24H     gabapentin  300 mg Oral TID     lisinopril  30 mg Oral Daily     multivitamin w/minerals  1 tablet Oral Daily     pantoprazole  40 mg Oral QAM AC     sodium chloride (PF)  3 mL Intracatheter Q8H     sodium chloride (PF)  3 mL Intracatheter Q8H     vitamin C  500 mg Oral Daily     cholecalciferol  25 mcg Oral Daily       Data reviewed today: I personally reviewed all new medications, labs, imaging/diagnostics reports over the " past 24 hours. Pertinent findings include    Labs:  Most Recent 3 CBC's:  Recent Labs   Lab Test 07/31/21  0753 07/30/21  0928 07/29/21  1835   WBC 5.2 4.7 5.2   HGB 11.7* 11.6* 11.8*   * 100 98   * 135* 145*     Most Recent 3 BMP's:  Recent Labs   Lab Test 07/31/21  0753 07/30/21  0928 07/29/21  1835    137 136   POTASSIUM 4.0 3.9  3.9 3.9   CHLORIDE 109* 105 103   CO2 23 23 23   BUN 15 17 21   CR 0.75 0.70 0.76   ANIONGAP 6 9 10   ELMA 8.1* 8.4* 8.8   GLC 80 74 87     Most Recent 6 Bacteria Isolates From Any Culture (See EPIC Reports for Culture Details):No lab results found.  Most Recent TSH and T4:  Recent Labs   Lab Test 07/13/21  0645   TSH 2.48       Imaging:   No results found for this or any previous visit (from the past 24 hour(s)).      Reshma Agarwal MD MD   Hospitalist  [facility]

## 2021-07-31 NOTE — PROGRESS NOTES
07/31/21 1120   Quick Adds   Type of Visit Initial PT Evaluation   Living Environment   People in home other (see comments)  (sister)   Current Living Arrangements house   General Information   Onset of Illness/Injury or Date of Surgery 07/30/21   Referring Physician Reshma Agarwal   Patient/Family Therapy Goals Statement (PT) to return home when able   Pertinent History of Current Problem (include personal factors and/or comorbidities that impact the POC) high complexity   Existing Precautions/Restrictions fall   Strength   Strength Comments R LE grossly 2/5 secondary to hx of CVA   Transfers   Transfer Safety Concerns Noted decreased weight-shifting ability;decreased sequencing ability   Impairments Contributing to Impaired Transfers abnormal muscle tone;impaired motor control;impaired coordination   Transfer Safety Comments mod assist for safe sit to stand   Gait/Stairs (Locomotion)   [place] Level (Gait) minimum assist (75% patient effort);1 person to manage equipment   Assistive Device (Gait) walker, front-wheeled  (pt can't grasp with R hand, would benefit from shanika walker)   Distance in Feet (Required for LE Total Joints) 3   Pattern (Gait) step-to   Deviations/Abnormal Patterns (Gait) weight shifting decreased;stride length decreased;antalgic  (R side hemiplegia)   Balance   Balance Comments pt stands asymmetrically on L LE, falls risk when shifting weight on to hemiplegic side due to poor proprioceptive awareness   Clinical Impression   Criteria for Skilled Therapeutic Intervention yes, treatment indicated   PT Diagnosis (PT) impaired functional mobility   Influenced by the following impairments weakness, decreased cognition   Functional limitations due to impairments transfers and ambulation   Clinical Presentation Unstable/Unpredictable   Clinical Presentation Rationale pt presents as medically diagnosed   Clinical Decision Making (Complexity) high complexity   Therapy Frequency (PT) Daily    Predicted Duration of Therapy Intervention (days/wks) 5 days   Planned Therapy Interventions (PT) bed mobility training;gait training;orthotic fitting/training;strengthening;transfer training   Anticipated Equipment Needs at Discharge (PT) walker, shanika;cane, quad   Risk & Benefits of therapy have been explained care plan/treatment goals reviewed;patient   PT Discharge Planning    PT Discharge Recommendation (DC Rec) Transitional Care Facility   PT Rationale for DC Rec pt would benefit from daily, intense therapy in TCU setting to optimize rehab outcome   Total Evaluation Time   Total Evaluation Time (Minutes) 10

## 2021-07-31 NOTE — PROVIDER NOTIFICATION
Dr. Gates paged at 0001 for pt regarding a oral temp of 102.3 Heart rate of 85. Asymptomatic. PRN Tylenol given with ice packs. NO new orders. Recheck was 100.2 then 98.9.

## 2021-07-31 NOTE — PLAN OF CARE
Problem: UTI (Urinary Tract Infection)  Goal: Improved Infection Symptoms  Outcome: Improving     Problem: Gas Exchange Impaired  Goal: Optimal Gas Exchange  Outcome: Improving   Patient came from ED with primary problem ; Pneumonitis , UTI.   Patient is on O2 2 L NC. Patient is very weak , external catheter in place ; PVR 15 ml.  HX: CVA, Right sided weakness, Epilepsy.   Patient on Ceftriaxone.  Patient is assist of 2 , Repositioned to sides , assist with feeding.   Patient has Urology consult.  VSS.

## 2021-07-31 NOTE — PROGRESS NOTES
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   Instrumental Activities of Daily Living (IADL)   IADL Comments pt typically able to complete ADL indep'ly includ func mob with SEC.   Disability/Function   Difficulty Communicating   (aphasia d/t CVA in '11)   General Information   Onset of Illness/Injury or Date of Surgery 07/29/21   Patient/Family Therapy Goal Statement (OT) ok to do therapy/toilet   Additional Occupational Profile Info/Pertinent History of Current Problem high   Limitations/Impairments   (R UE weakness)   Cognitive Status Examination   Orientation Status   (grossly intact, able to answer y/n questions and offer info)   Range of Motion Comprehensive   Comment, General Range of Motion decr AROM/strength at R UE   Coordination   Coordination Comments incr time for coordination tasks   Transfers   Transfers   (needed mod-max Ax2 for chair tx to stand at sink for g/h.)   Balance   Balance Assessment standing balance: static   Activities of Daily Living   BADL Assessment grooming   Grooming Assessment   [place] Level (Grooming) moderate assist (50% patient effort)   Position (Grooming) supported standing   Comment (Grooming) educ in safe standing   Clinical Impression   Criteria for Skilled Therapeutic Interventions Met (OT) yes   OT Diagnosis decr ADL independence   OT Problem List-Impairments impacting ADL balance;activity tolerance impaired;range of motion (ROM);strength  (SOB)   Assessment of Occupational Performance 3-5 Performance Deficits   Planned Therapy Interventions (OT) ADL  retraining;transfer training   Clinical Decision Making Complexity (OT) high complexity   Therapy Frequency (OT) Daily   Predicted Duration of Therapy 1 week   Risk & Benefits of therapy have been explained patient   OT Discharge Planning    OT Discharge Recommendation (DC Rec) Transitional Care Facility;Acute Rehab Center-Motivated patient will benefit from intensive, interdisciplinary therapy.  Anticipate will be able to tolerate 3 hours of therapy per day   OT Rationale for DC Rec pt needs Ax2 for mobility at this time   Total Evaluation Time (Minutes)   Total Evaluation Time (Minutes) 5

## 2021-07-31 NOTE — CONSULTS
[facility] CONSULTATION    Type of Consult: inpatient  Place of Service: [facility]   Reason for consult: UTI, complex renal cyst      History of present illness:   Goldy Peraza is a 66 year old male that was admitted for UTI and acute encephalopathy. Urology is being consulted for UTI and complex right renal cyst. History obtained through patient and chart review.     Mr. Peraza presented to the ER for right foot pain and on evaluation was found to have a UTI and encephalopathy for which he was admitted. Per the H&P he was recently treated for a UTI at [facility].  Per the patient, he has some difficulty urinating at baseline. He is on a alfuzosin as an outpatient for history of BPH. He denies any acute changes in his voiding today. Denies any dysuria, abdominal pain, flank pain, or hematuria.  However he was febrile overnight to 102.3, most recent temperature 98.4. HR and Bps remain stable. WBC on admission was 5.2, today 5.2. Cr 0.75 stable. UA with positive nitrite, LE, bateria, and WBC, concerning for infection. Urine culture is pending. He is on ancef.   A CT scan was performed showing a complex 6cm right lower pole renal cysts. No stones or hydronephrosis. There is bladder wall thickening as well.  He does not have a urinary catheter right now. Wearing a diaper and voiding into urinal. UOP recorded as 2.1L since midnight.     Past Medical History:  No past medical history on file.    Past Surgical History:  Past Surgical History:   Procedure Laterality Date     IR MISCELLANEOUS PROCEDURE  3/18/2009     IR MISCELLANEOUS PROCEDURE  3/20/2009       Social History:  Social History     Socioeconomic History     Marital status: Single     Spouse name: Not on file     Number of children: Not on file     Years of education: Not on file     Highest education level: Not on file   Occupational History     Not on file   Tobacco Use     Smoking status: Current Every Day Smoker     Packs/day: 1.00      Types: Cigarettes   Substance and Sexual Activity     Alcohol use: Not on file     Drug use: Not on file     Sexual activity: Not on file   Other Topics Concern     Not on file   Social History Narrative     Not on file     Social Determinants of Health     Financial Resource Strain:      Difficulty of Paying Living Expenses:    Food Insecurity:      Worried About Running Out of Food in the Last Year:      Ran Out of Food in the Last Year:    Transportation Needs:      Lack of Transportation (Medical):      Lack of Transportation (Non-Medical):    Physical Activity:      Days of Exercise per Week:      Minutes of Exercise per Session:    Stress:      Feeling of Stress :    Social Connections:      Frequency of Communication with Friends and Family:      Frequency of Social Gatherings with Friends and Family:      Attends Jewish Services:      Active Member of Clubs or Organizations:      Attends Club or Organization Meetings:      Marital Status:    Intimate Partner Violence:      Fear of Current or Ex-Partner:      Emotionally Abused:      Physically Abused:      Sexually Abused:        Medications:  Current Facility-Administered Medications   Medication     acetaminophen (TYLENOL) tablet 650 mg    Or     acetaminophen (TYLENOL) Suppository 650 mg     acetaminophen-codeine 150-15 MG per half-tab 1 half-tab     alfuzosin ER (UROXATRAL) 24 hr tablet 10 mg     aspirin (ASA) tablet 325 mg     atorvastatin (LIPITOR) tablet 40 mg     baclofen (LIORESAL) tablet 20 mg     calcium carbonate-vitamin D (OSCAL w/D) per tablet 2 tablet     cefTRIAXone (ROCEPHIN) 1 g vial to attach to  mL bag for ADULTS or NS 50 mL bag for PEDS     diclofenac (VOLTAREN) 1 % topical gel 4 g     divalproex sodium extended-release (DEPAKOTE ER) 24 hr tablet 1,000 mg     divalproex sodium extended-release (DEPAKOTE ER) 24 hr tablet 1,500 mg     enoxaparin ANTICOAGULANT (LOVENOX) injection 40 mg     gabapentin (NEURONTIN) capsule 300 mg      lidocaine (LMX4) cream     lidocaine 1 % 0.1-1 mL     lisinopril (ZESTRIL) tablet 30 mg     multivitamin w/minerals (THERA-VIT-M) tablet 1 tablet     pantoprazole (PROTONIX) EC tablet 40 mg     senna-docusate (SENOKOT-S/PERICOLACE) 8.6-50 MG per tablet 2 tablet     sodium chloride (PF) 0.9% PF flush 3 mL     sodium chloride (PF) 0.9% PF flush 3 mL     sodium chloride (PF) 0.9% PF flush 3 mL     sodium chloride (PF) 0.9% PF flush 3 mL     sodium chloride 0.9% infusion     vitamin C (ASCORBIC ACID) tablet 500 mg     Vitamin D3 (CHOLECALCIFEROL) tablet 25 mcg       Allergies:   No Known Allergies    Review of Systems:   A full 12 point review of systems was taken and is negative aside from what is noted above in the HPI    Physical Exam:  Temp:  [97.6  F (36.4  C)-102.3  F (39.1  C)] 98.4  F (36.9  C)  Pulse:  [64-85] 64  Resp:  [18-21] 18  BP: (104-143)/(58-77) 129/77  FiO2 (%):  [2 %] 2 %  SpO2:  [92 %-99 %] 96 %  General: NAD, alert, cooperative  Head: normocephalic, without abnormality / atraumatic  Abdomen: soft, non tender, non distended. no suprapubic fullness/tenderness. no CVA tenderness noted  Geniturinary: circumcised penis, orthotopic meatus, no urethral discharge, no scrotal tenderness or swelling on exam  Skin: no rashes or lesions  Musculoskeletal: moves b/l extremities equally; no calf edema or tenderness  Psychological: alert and oriented, answers questions appropriately      Labs:   Lab Results   Component Value Date    WBC 5.2 07/31/2021    HGB 11.7 (L) 07/31/2021    HCT 35.5 (L) 07/31/2021     (L) 07/31/2021    ALT 18 07/30/2021    AST 26 07/30/2021     07/31/2021    BUN 15 07/31/2021    CO2 23 07/31/2021    TSH 2.48 07/13/2021        Lab Results   Component Value Date    NITRITE Positive (A) 07/29/2021    BACTERIA Few (A) 07/29/2021        Urine culture:pending    Lab Results: personally reviewed     Imaging:  CT abd/pelvis 7/29/21:  IMPRESSION:   1.  Focal area of peripheral decreased  enhancement in the spleen medially is concerning for a small acute splenic infarct.  2.  Partially exophytic complex cyst in the right kidney lower pole measuring 6.2 cm with multiple septations. A cystic renal cell neoplasm cannot be excluded. An MRI of the kidneys without and with IV gadolinium is recommended in the near future.  3.  Multiple low-attenuation lesions in the liver. These may represent benign cysts and can be reassessed at the time of the renal MRI.  4.  Moderate changes of pulmonary fibrosis in both lung bases.  5.  Diffuse atheromatous plaque in the abdominal aorta.    I have personally reviewed the imaging reports above.     Assessment / Plan : Goldy Peraza is being seen by [facility] for UTI and complex renal cyst.    - #UTI: UA is concerning for UTI, urine culture is pending. Reportedly he was treated for a UTI at another recent hospitalization. Continue ceftriaxone until urine culture results, then culture directed antibiotics. Would recommend bladder scans / PVRs today to ensure complete bladder emptying. If elevated PVRs, may need urinary catheter in place while UTI is being treated. Continue home alfuzosin.   - #complex renal cyst on right kidney: Will arrange for outpatient urology follow up to monitor cyst. An MRI can better characterize cyst as well and can be done as inpatient if possible.       Thank you for consulting [facility] regarding this patient's care. Please contact us with questions or concerns.     GEO NEWMAN MD  [facility]   [phone]

## 2021-08-01 ENCOUNTER — APPOINTMENT (OUTPATIENT)
Dept: MRI IMAGING | Facility: CLINIC | Age: 66
DRG: 689 | End: 2021-08-01
Attending: INTERNAL MEDICINE
Payer: MEDICARE

## 2021-08-01 ENCOUNTER — HOSPITAL ENCOUNTER (INPATIENT)
Dept: CARDIOLOGY | Facility: CLINIC | Age: 66
DRG: 689 | End: 2021-08-01
Attending: INTERNAL MEDICINE
Payer: MEDICARE

## 2021-08-01 ENCOUNTER — APPOINTMENT (OUTPATIENT)
Dept: PHYSICAL THERAPY | Facility: CLINIC | Age: 66
DRG: 689 | End: 2021-08-01
Payer: MEDICARE

## 2021-08-01 LAB
LVEF ECHO: NORMAL
MAGNESIUM SERPL-MCNC: 1.8 MG/DL (ref 1.8–2.6)
POTASSIUM BLD-SCNC: 3.6 MMOL/L (ref 3.5–5)

## 2021-08-01 PROCEDURE — 258N000003 HC RX IP 258 OP 636: Performed by: INTERNAL MEDICINE

## 2021-08-01 PROCEDURE — 83735 ASSAY OF MAGNESIUM: CPT | Performed by: INTERNAL MEDICINE

## 2021-08-01 PROCEDURE — 120N000001 HC R&B MED SURG/OB

## 2021-08-01 PROCEDURE — 250N000013 HC RX MED GY IP 250 OP 250 PS 637: Performed by: INTERNAL MEDICINE

## 2021-08-01 PROCEDURE — 255N000002 HC RX 255 OP 636: Performed by: INTERNAL MEDICINE

## 2021-08-01 PROCEDURE — 84132 ASSAY OF SERUM POTASSIUM: CPT | Performed by: INTERNAL MEDICINE

## 2021-08-01 PROCEDURE — A9585 GADOBUTROL INJECTION: HCPCS | Performed by: INTERNAL MEDICINE

## 2021-08-01 PROCEDURE — 36415 COLL VENOUS BLD VENIPUNCTURE: CPT | Performed by: INTERNAL MEDICINE

## 2021-08-01 PROCEDURE — 99233 SBSQ HOSP IP/OBS HIGH 50: CPT | Performed by: INTERNAL MEDICINE

## 2021-08-01 PROCEDURE — 250N000013 HC RX MED GY IP 250 OP 250 PS 637: Performed by: EMERGENCY MEDICINE

## 2021-08-01 PROCEDURE — 93306 TTE W/DOPPLER COMPLETE: CPT | Mod: 26 | Performed by: INTERNAL MEDICINE

## 2021-08-01 PROCEDURE — 74183 MRI ABD W/O CNTR FLWD CNTR: CPT

## 2021-08-01 PROCEDURE — 97116 GAIT TRAINING THERAPY: CPT | Mod: GP

## 2021-08-01 PROCEDURE — 250N000011 HC RX IP 250 OP 636: Performed by: INTERNAL MEDICINE

## 2021-08-01 PROCEDURE — 93306 TTE W/DOPPLER COMPLETE: CPT

## 2021-08-01 RX ORDER — GADOBUTROL 604.72 MG/ML
0.1 INJECTION INTRAVENOUS ONCE
Status: COMPLETED | OUTPATIENT
Start: 2021-08-01 | End: 2021-08-01

## 2021-08-01 RX ORDER — CEFDINIR 300 MG/1
300 CAPSULE ORAL EVERY 12 HOURS SCHEDULED
Status: DISCONTINUED | OUTPATIENT
Start: 2021-08-01 | End: 2021-08-05

## 2021-08-01 RX ADMIN — Medication 25 MCG: at 09:04

## 2021-08-01 RX ADMIN — BACLOFEN 20 MG: 10 TABLET ORAL at 14:29

## 2021-08-01 RX ADMIN — MULTIPLE VITAMINS W/ MINERALS TAB 1 TABLET: TAB at 09:04

## 2021-08-01 RX ADMIN — ATORVASTATIN CALCIUM 40 MG: 40 TABLET, FILM COATED ORAL at 21:02

## 2021-08-01 RX ADMIN — GABAPENTIN 300 MG: 300 CAPSULE ORAL at 09:04

## 2021-08-01 RX ADMIN — LISINOPRIL 30 MG: 20 TABLET ORAL at 09:04

## 2021-08-01 RX ADMIN — SODIUM CHLORIDE: 9 INJECTION, SOLUTION INTRAVENOUS at 14:39

## 2021-08-01 RX ADMIN — ASPIRIN 325 MG ORAL TABLET 325 MG: 325 PILL ORAL at 09:04

## 2021-08-01 RX ADMIN — GABAPENTIN 300 MG: 300 CAPSULE ORAL at 14:29

## 2021-08-01 RX ADMIN — DIVALPROEX SODIUM 1000 MG: 500 TABLET, EXTENDED RELEASE ORAL at 09:04

## 2021-08-01 RX ADMIN — DIVALPROEX SODIUM 1500 MG: 500 TABLET, EXTENDED RELEASE ORAL at 21:02

## 2021-08-01 RX ADMIN — CEFDINIR 300 MG: 300 CAPSULE ORAL at 21:01

## 2021-08-01 RX ADMIN — ACETAMINOPHEN AND CODEINE PHOSPHATE 1 HALF-TAB: 300; 30 TABLET ORAL at 15:19

## 2021-08-01 RX ADMIN — BACLOFEN 20 MG: 10 TABLET ORAL at 09:04

## 2021-08-01 RX ADMIN — GADOBUTROL 9 ML: 604.72 INJECTION INTRAVENOUS at 17:44

## 2021-08-01 RX ADMIN — ENOXAPARIN SODIUM 40 MG: 100 INJECTION SUBCUTANEOUS at 11:01

## 2021-08-01 RX ADMIN — GABAPENTIN 300 MG: 300 CAPSULE ORAL at 20:57

## 2021-08-01 RX ADMIN — BACLOFEN 20 MG: 10 TABLET ORAL at 20:56

## 2021-08-01 RX ADMIN — Medication 2 TABLET: at 09:04

## 2021-08-01 RX ADMIN — OXYCODONE HYDROCHLORIDE AND ACETAMINOPHEN 500 MG: 500 TABLET ORAL at 09:04

## 2021-08-01 RX ADMIN — ALFUZOSIN HYDROCHLORIDE 10 MG: 10 TABLET ORAL at 09:04

## 2021-08-01 RX ADMIN — SODIUM CHLORIDE: 9 INJECTION, SOLUTION INTRAVENOUS at 02:51

## 2021-08-01 RX ADMIN — PANTOPRAZOLE SODIUM 40 MG: 20 TABLET, DELAYED RELEASE ORAL at 06:42

## 2021-08-01 ASSESSMENT — MIFFLIN-ST. JEOR: SCORE: 1686.18

## 2021-08-01 NOTE — PLAN OF CARE
Problem: Adult Inpatient Plan of Care  Goal: Plan of Care Review  Outcome: Improving     -Patient is alert and oriented x 3, pleasant with cares. Hx of CVA with residual right sided weakness and hesitant speech. Potassium and magnesium protocols, recheck tomorrow AM. NS infusing at 75mL/hr. Pt is going to be getting an MRI later today, currently scheduled for around [id number]. Has to be NPO for 4 hours before MRI, last food and drink at 1300, MRI staff aware. Checklist completed and faxed. His son was here this afternoon and got an update from the MD as well. Up to commode to have a BM. Lets staff know when he needs to use a urinal.     Susan JUAREZ RN  08/01/21

## 2021-08-01 NOTE — PROGRESS NOTES
"  Place of Service:  [facility]     Reason for follow up: UTI, complex renal cyst    SUBJECTIVE:  Events: no acute events overnight     Afebrile overnight. Voiding without issue, PVRs minimal. UOP 3.5L yesterday, 1.4L since midnight. No new labs this morning.  Urine culture with >100k E. Coli, sensitive to ceftriaxone.   He reports feeling well this morning    OBJECTIVE:  PHYSICAL EXAM:  /73 (BP Location: Right arm)   Pulse 63   Temp 98.1  F (36.7  C) (Oral)   Resp 16   Ht 1.778 m (5' 10\")   Wt 90.5 kg (199 lb 8 oz)   SpO2 93%   BMI 28.63 kg/m     General: NAD, alert, cooperative  Head: normocephalic, without abnormality / atraumatic  Abdomen: soft, non tender, non distended. no suprapubic fullness, no suprapubic tenderness. no CVA tenderness,   Genitourinary: deferred today  Skin: No rashes or lesions  Musculoskeletal: moves all four extremities equally; no calf edema or tenderness  Psychological: alert and oriented, answers questions appropriately    LABS:  Lab Results   Component Value Date    WBC 5.2 07/31/2021    HGB 11.7 (L) 07/31/2021    HCT 35.5 (L) 07/31/2021     (L) 07/31/2021    ALT 18 07/30/2021    AST 26 07/30/2021     07/31/2021    BUN 15 07/31/2021    CO2 23 07/31/2021    TSH 2.48 07/13/2021        Cultures:   Urine culture 7/29: E. Coli >100k    Lab Results: personally reviewed.     ASSESSMENT/PLAN:  Goldy Peraza is being seen by [facility] for UTI and complex renal cyst    - #UTI: Urine culture growing E. Coli, sensitive to ceftriaxone. He has remained afebrile and asymptomatic on the ceftriaxone. PVRs minimal. Continue home alfuzosin. Continue antibiotics - likely will need 10-14 day course. We will arrange for outpatient follow up for recurrent UTIs - will likely need cystoscopy as outpatient.  - #complex renal cyst on right kidney: Will arrange for outpatient urology follow up to monitor cyst. An MRI can better characterize cyst as well and can be done as " inpatient if possible.        GEO NEWMAN MD   [facility]

## 2021-08-01 NOTE — PLAN OF CARE
Pt slept well overnight. Had c/o a HA and was given acetaminophen 650mg. VSS. Afebrile. On the purewick overnight. Bladder scan q shift.  Bladder empty after voiding. On bed alarms. Call light in place. Mg and K protocols. No replacement needed. Recheck in the AM.    Problem: Adult Inpatient Plan of Care  Goal: Absence of Hospital-Acquired Illness or Injury  Intervention: Identify and Manage Fall Risk  Recent Flowsheet Documentation  Taken 7/31/2021 1378 by Yemi Ashford, RN  Safety Promotion/Fall Prevention:    activity supervised    bed alarm on    room near nurse's station    Goal: Optimal Comfort and Wellbeing  Outcome: Improving  Goal: Readiness for Transition of Care  Outcome: Improving

## 2021-08-01 NOTE — PROGRESS NOTES
Care Management Initial Consult    General Information  Assessment completed with: Children,  (Goldy Elkins)  Type of CM/SW Visit: Initial Assessment    Primary Care Provider verified and updated as needed: No   Readmission within the last 30 days: no previous admission in last 30 days      Reason for Consult: discharge planning  Advance Care Planning: Advance Care Planning Reviewed: questions answered          Communication Assessment  Patient's communication style: spoken language (English or Bilingual)    Hearing Difficulty or Deaf: no   Wear Glasses or Blind: yes    Cognitive  Cognitive/Neuro/Behavioral: .WDL except, speech           Mood/Behavior: flat affect  Best Language: 1 - Mild to moderate  Speech: hesitant    Living Environment:   People in home: sibling(s) (Pt has been staying at his sisters' home.)     Current living Arrangements: house      Able to return to prior arrangements: no  Living Arrangement Comments:  (Pt is no longer able to reside in his sister's home safely.)    Family/Social Support:  Care provided by: child(irma), other (see comments) (Sister as able. Not able.)  Provides care for: no one, unable/limited ability to care for self  Marital Status: Single  Children          Description of Support System: Supportive, Involved    Support Assessment: Caregiver difficulty providing physical care/safety    Current Resources:   Patient receiving home care services: No     Community Resources: None  Equipment currently used at home: walker, standard  Supplies currently used at home:      Employment/Financial:  Employment Status: retired        Financial Concerns: No concerns identified   Referral to Financial Counselor: No       Lifestyle & Psychosocial Needs:  Social Determinants of Health     Tobacco Use: High Risk     Smoking Tobacco Use: Current Every Day Smoker     Smokeless Tobacco Use: Unknown   Alcohol Use:      Frequency of Alcohol Consumption:      Average Number of Drinks:      Frequency  of Binge Drinking:    Financial Resource Strain:      Difficulty of Paying Living Expenses:    Food Insecurity:      Worried About Running Out of Food in the Last Year:      Ran Out of Food in the Last Year:    Transportation Needs:      Lack of Transportation (Medical):      Lack of Transportation (Non-Medical):    Physical Activity:      Days of Exercise per Week:      Minutes of Exercise per Session:    Stress:      Feeling of Stress :    Social Connections:      Frequency of Communication with Friends and Family:      Frequency of Social Gatherings with Friends and Family:      Attends Sabianism Services:      Active Member of Clubs or Organizations:      Attends Club or Organization Meetings:      Marital Status:    Intimate Partner Violence:      Fear of Current or Ex-Partner:      Emotionally Abused:      Physically Abused:      Sexually Abused:    Depression:      PHQ-2 Score:    Housing Stability:      Unable to Pay for Housing in the Last Year:      Number of Places Lived in the Last Year:      Unstable Housing in the Last Year:        Functional Status:  Prior to admission patient needed assistance:         Assesssment of Functional Status: Needs placement in a SNF/TCF for rehabilitation    Mental Health Status:  Mental Health Status: No Current Concerns       Chemical Dependency Status:  Chemical Dependency Status: No Current Concerns             Values/Beliefs:  Spiritual, Cultural Beliefs, Sabianism Practices, Values that affect care: no               Additional Information:  Writer(SARI) met with pt and his son Goldy to obtain a discharge assessment. Pt has received TCU recs from the [facility]. Pt's son Goldy is in agreement with TCU placement and stated a desire to have pt stay in the [facility](referrals sent). Goldy may or may not review TCUs on the MEDICARE website and provide additional placement options. Goldy has been provided with resources to address a safer living environment after TCU stay  is completed.    CM to follow.    Wing Pereira RN

## 2021-08-01 NOTE — PLAN OF CARE
Problem: Adult Inpatient Plan of Care  Goal: Plan of Care Review  Outcome: Improving     -Patient is alert and oriented, answers questions appropriately. Receiving IV rocephin for dx of UTI. On room air all day, sats around 93-94%. Denies pain. Potassium and magnesium protocols, both rechecks tomorrow. UC positive for E. Coli. Regular diet. Afebrile.     Susan JUAREZ RN  07/31/21

## 2021-08-01 NOTE — PROGRESS NOTES
"[facility] Medicine PROGRESS NOTE      Identification/Summary: Goldy Peraza is 66 year old male with past medical history of CVA with right-sided weakness, expressive aphasia, hypertension depression who was admitted on 7/29/2021 for UTI, acute encephalopathy.  On ceftriaxone. urine culture fluoroquinolone resistant E. coli.  Change to cefdinir.  Observe.  Will need acute rehab at discharge.  Son was updated.    Assessment and Plan:  1. UTI --   Fevers resolving  History of BPH   6.2cm complex right kidney cyst On CT   bladder scan postvoid minimal.  Urology consulted for cyst, recommending MRI for better evaluation ordered  Urine culture fluoroquinolone resistant E. coli  Change antibiotics to cefdinir    2. Has right foot pain for \"months\" - no fracture on XR. And RLE US showed no DVT  3.  Acute metabolic encephalopathy    Head CT was negative.  Holding parameters written for gabapentin, baclofen  No new focal neurological deficits    4. history of CVA with right sided residual weakness, some aphasia, and history of epilepsy  On aspirin, statin, baclofen  5. On chronic marijuana use per records - unclear indications  But records indicate chronic right knee and hip pain   6. HTN   7. History of Pulmonary fibrosis - prior imagings ? ILD. Our chest imaging here showed pneumonitis -- but patient doesn't have any respiratory complaints.   Has mediastinal hilar adenopathy on CT  Will need repeat imaging in 2 months    8.small splenic infarct --- patient is not complaining of pain on the left abdomen or flank. Lactic acid is normal.  Check echocardiogram to rule out cardiac thrombus  Follow-up with hematology as outpatient    Thrombocytopenia  Monitor    Diet: Combination Diet Regular Diet Adult  DVT Prophylaxis:  LOVENOX  Code Status: Full Code    Anticipated possible discharge in 1 days once placement, stable on oral antibiotics milestones are met.    Interval History/Subjective:  Denies shortness of breath, " "chest pain. Denied any abdominal pain. No nausea vomiting.     Physical Exam/Objective:  Vitals I/O   Vital signs:  Temp: 98  F (36.7  C) Temp src: Oral BP: 122/75 Pulse: 76   Resp: 18 SpO2: 96 % O2 Device: None (Room air) Oxygen Delivery: 2 LPM Height: 177.8 cm (5' 10\") Weight: 90.5 kg (199 lb 8 oz)  Estimated body mass index is 28.63 kg/m  as calculated from the following:    Height as of this encounter: 1.778 m (5' 10\").    Weight as of this encounter: 90.5 kg (199 lb 8 oz).       I/O last 3 completed shifts:  In: 1260 [P.O.:1260]  Out: 3650 [Urine:3650]     Body mass index is 28.63 kg/m .    General Appearance:  Alert, cooperative, no distress   Head:  Normocephalic, atraumatic   Eyes:  PERRL    Throat:  mucosa; moist   Neck: No JVD, thyromegaly   Lungs:    Rales at the bases bilaterally, respirations unlabored   Chest Wall:  No tenderness or deformity   Heart:  Regular rate and rhythm, S1, S2 normal,no murmur   Abdomen:   Soft, tender in suprapubic area, non distended, bowel sounds present, no guarding or rigidity   Extremities:  Trace edema in both lower extremities, no joint swelling   Skin: Skin color, texture, turgor normal, no rashes or lesions   Neurologic: Alert and oriented X 3, chronic right-sided weakness       Medications:   Personally Reviewed.    alfuzosin ER  10 mg Oral Daily     aspirin  325 mg Oral Daily     atorvastatin  40 mg Oral At Bedtime     Baclofen  20 mg Oral TID     calcium carbonate-vitamin D  2 tablet Oral Daily     cefTRIAXone  1 g Intravenous Q24H     divalproex sodium extended-release  1,000 mg Oral QAM     divalproex sodium extended-release  1,500 mg Oral At Bedtime     enoxaparin ANTICOAGULANT  40 mg Subcutaneous Q24H     gabapentin  300 mg Oral TID     lisinopril  30 mg Oral Daily     multivitamin w/minerals  1 tablet Oral Daily     pantoprazole  40 mg Oral QAM AC     sodium chloride (PF)  3 mL Intracatheter Q8H     sodium chloride (PF)  3 mL Intracatheter Q8H     vitamin C  " 500 mg Oral Daily     cholecalciferol  25 mcg Oral Daily       Data reviewed today: I personally reviewed all new medications, labs, imaging/diagnostics reports over the past 24 hours. Pertinent findings include    Labs:  Most Recent 3 CBC's:  Recent Labs   Lab Test 07/31/21  0753 07/30/21  0928 07/29/21  1835   WBC 5.2 4.7 5.2   HGB 11.7* 11.6* 11.8*   * 100 98   * 135* 145*     Most Recent 3 BMP's:  Recent Labs   Lab Test 08/01/21  0440 07/31/21  0753 07/30/21  0928 07/29/21  1835   NA  --  138 137 136   POTASSIUM 3.6 4.0 3.9  3.9 3.9   CHLORIDE  --  109* 105 103   CO2  --  23 23 23   BUN  --  15 17 21   CR  --  0.75 0.70 0.76   ANIONGAP  --  6 9 10   ELMA  --  8.1* 8.4* 8.8   GLC  --  80 74 87     Most Recent 6 Bacteria Isolates From Any Culture (See EPIC Reports for Culture Details):No lab results found.  Most Recent TSH and T4:  Recent Labs   Lab Test 07/13/21  0645   TSH 2.48       Imaging:   No results found for this or any previous visit (from the past 24 hour(s)).      Reshma Agarwal MD  Hospitalist  [facility]

## 2021-08-02 ENCOUNTER — APPOINTMENT (OUTPATIENT)
Dept: PHYSICAL THERAPY | Facility: CLINIC | Age: 66
DRG: 689 | End: 2021-08-02
Payer: MEDICARE

## 2021-08-02 LAB
CREAT SERPL-MCNC: 0.72 MG/DL (ref 0.7–1.3)
GFR SERPL CREATININE-BSD FRML MDRD: >90 ML/MIN/1.73M2
MAGNESIUM SERPL-MCNC: 1.7 MG/DL (ref 1.8–2.6)
PLATELET # BLD AUTO: 179 10E3/UL (ref 150–450)
POTASSIUM BLD-SCNC: 3.8 MMOL/L (ref 3.5–5)

## 2021-08-02 PROCEDURE — 85049 AUTOMATED PLATELET COUNT: CPT | Performed by: INTERNAL MEDICINE

## 2021-08-02 PROCEDURE — 99232 SBSQ HOSP IP/OBS MODERATE 35: CPT | Performed by: INTERNAL MEDICINE

## 2021-08-02 PROCEDURE — 82565 ASSAY OF CREATININE: CPT | Performed by: INTERNAL MEDICINE

## 2021-08-02 PROCEDURE — 97530 THERAPEUTIC ACTIVITIES: CPT | Mod: GP

## 2021-08-02 PROCEDURE — 250N000013 HC RX MED GY IP 250 OP 250 PS 637: Performed by: EMERGENCY MEDICINE

## 2021-08-02 PROCEDURE — 84132 ASSAY OF SERUM POTASSIUM: CPT | Performed by: INTERNAL MEDICINE

## 2021-08-02 PROCEDURE — 250N000011 HC RX IP 250 OP 636: Performed by: INTERNAL MEDICINE

## 2021-08-02 PROCEDURE — 120N000001 HC R&B MED SURG/OB

## 2021-08-02 PROCEDURE — 258N000003 HC RX IP 258 OP 636: Performed by: INTERNAL MEDICINE

## 2021-08-02 PROCEDURE — 250N000013 HC RX MED GY IP 250 OP 250 PS 637: Performed by: INTERNAL MEDICINE

## 2021-08-02 PROCEDURE — 83735 ASSAY OF MAGNESIUM: CPT | Performed by: INTERNAL MEDICINE

## 2021-08-02 PROCEDURE — 99207 PR CDG-MDM COMPONENT: MEETS MODERATE - DOWN CODED: CPT | Performed by: INTERNAL MEDICINE

## 2021-08-02 PROCEDURE — 36415 COLL VENOUS BLD VENIPUNCTURE: CPT | Performed by: INTERNAL MEDICINE

## 2021-08-02 RX ADMIN — CEFDINIR 300 MG: 300 CAPSULE ORAL at 20:44

## 2021-08-02 RX ADMIN — MULTIPLE VITAMINS W/ MINERALS TAB 1 TABLET: TAB at 10:19

## 2021-08-02 RX ADMIN — BACLOFEN 20 MG: 10 TABLET ORAL at 20:22

## 2021-08-02 RX ADMIN — ASPIRIN 325 MG ORAL TABLET 325 MG: 325 PILL ORAL at 10:19

## 2021-08-02 RX ADMIN — DIVALPROEX SODIUM 1000 MG: 500 TABLET, EXTENDED RELEASE ORAL at 10:23

## 2021-08-02 RX ADMIN — ALFUZOSIN HYDROCHLORIDE 10 MG: 10 TABLET ORAL at 11:46

## 2021-08-02 RX ADMIN — PANTOPRAZOLE SODIUM 40 MG: 20 TABLET, DELAYED RELEASE ORAL at 07:05

## 2021-08-02 RX ADMIN — CEFDINIR 300 MG: 300 CAPSULE ORAL at 11:46

## 2021-08-02 RX ADMIN — BACLOFEN 20 MG: 10 TABLET ORAL at 13:05

## 2021-08-02 RX ADMIN — GABAPENTIN 300 MG: 300 CAPSULE ORAL at 10:19

## 2021-08-02 RX ADMIN — DIVALPROEX SODIUM 1500 MG: 500 TABLET, EXTENDED RELEASE ORAL at 20:22

## 2021-08-02 RX ADMIN — LISINOPRIL 30 MG: 20 TABLET ORAL at 10:19

## 2021-08-02 RX ADMIN — GABAPENTIN 300 MG: 300 CAPSULE ORAL at 20:23

## 2021-08-02 RX ADMIN — Medication 2 TABLET: at 10:19

## 2021-08-02 RX ADMIN — OXYCODONE HYDROCHLORIDE AND ACETAMINOPHEN 500 MG: 500 TABLET ORAL at 10:19

## 2021-08-02 RX ADMIN — ACETAMINOPHEN 650 MG: 325 TABLET ORAL at 17:07

## 2021-08-02 RX ADMIN — BACLOFEN 20 MG: 10 TABLET ORAL at 10:18

## 2021-08-02 RX ADMIN — ENOXAPARIN SODIUM 40 MG: 100 INJECTION SUBCUTANEOUS at 10:25

## 2021-08-02 RX ADMIN — ACETAMINOPHEN AND CODEINE PHOSPHATE 1 HALF-TAB: 300; 30 TABLET ORAL at 20:45

## 2021-08-02 RX ADMIN — SODIUM CHLORIDE: 9 INJECTION, SOLUTION INTRAVENOUS at 10:24

## 2021-08-02 RX ADMIN — Medication 25 MCG: at 10:19

## 2021-08-02 RX ADMIN — ATORVASTATIN CALCIUM 40 MG: 40 TABLET, FILM COATED ORAL at 20:23

## 2021-08-02 RX ADMIN — GABAPENTIN 300 MG: 300 CAPSULE ORAL at 13:05

## 2021-08-02 ASSESSMENT — MIFFLIN-ST. JEOR: SCORE: 1697.52

## 2021-08-02 NOTE — PROGRESS NOTES
"  Place of Service:  [facility]     Reason for follow up: UTI, complex renal cyst    SUBJECTIVE:  Events: no acute events overnight     Last fever 7/30/21. Voiding without issue, wearing male external urine collection device. PVRs minimal yesterday. He reports feeling well this morning. Denies bladder and bilateral flank pain.     OBJECTIVE:  PHYSICAL EXAM:  BP (!) 159/84   Pulse 54   Temp 97.6  F (36.4  C) (Oral)   Resp 18   Ht 1.778 m (5' 10\")   Wt 91.6 kg (202 lb)   SpO2 97%   BMI 28.98 kg/m     General: NAD, alert, cooperative  Head: normocephalic, without abnormality / atraumatic  Abdomen: soft, non tender, non distended. no suprapubic fullness, no suprapubic tenderness. no bilateral CVA tenderness.    Genitourinary: Penis and scrotum somewhat difficult to evaluate d/t external urine collection device, no obvious erythema or significant edema noted.   Skin: No rashes or lesions over abdomen.   Musculoskeletal: Right-sided weakness  Psychological: alert and oriented to self and location, confused on day of week and year. Also some aphasia.     LABS:  Lab Results   Component Value Date    WBC 5.2 07/31/2021    HGB 11.7 (L) 07/31/2021    HCT 35.5 (L) 07/31/2021     08/02/2021    ALT 18 07/30/2021    AST 26 07/30/2021     07/31/2021    BUN 15 07/31/2021    CO2 23 07/31/2021    TSH 2.48 07/13/2021      Creatinine   Date Value Ref Range Status   08/02/2021 0.72 0.70 - 1.30 mg/dL Final     Cultures:   Urine culture 7/29: E. Coli >100k  Susceptibility     Escherichia coli     ELAN     Ampicillin >16 ug/mL Resistant     Cefazolin 4 ug/mL Susceptible     Cefepime <=1 ug/mL Susceptible     Ceftriaxone <=1 ug/mL Susceptible     Ciprofloxacin >2 ug/mL Resistant     Gentamicin >8 ug/mL Resistant     Levofloxacin 2 ug/mL Resistant     Meropenem <=0.5 ug/mL Susceptible     Nitrofurantoin <=16 ug/mL Susceptible     Tetracycline <=2 ug/mL Susceptible     Tobramycin 8 ug/mL Intermediate     " "Trimethoprim/Sulfa >2/38 ug/mL Resistant        Lab Results: personally reviewed.     ASSESSMENT/PLAN:  Goldy Peraza is being seen by [facility] for UTI and complex renal cyst    - UTI: Urine culture growing E. Coli, sensitive to ceftriaxone. He remained afebrile and asymptomatic on the ceftriaxone. Now receiving po Omnicef. WBC WNL. PVRs minimal. Creatinine WNL. Continue home alfuzosin. Continue antibiotics -recommend 10-14 day course. We will arrange for outpatient follow up for recurrent UTIs -  Per Dr. Zarco, \"will likely need cystoscopy as outpatient\".   - complex renal cyst on right kidney: Will arrange for outpatient urology follow up to monitor cyst. An MRI can better characterize cyst - pt is scheduled for this today as inpatient.   - Email sent to schedulers to arrange follow up and contact info placed in discharge orders.   - Will watch for MRI results and then sign off. Please re-consult with any new or worsening urologic concerns.    Eleuterio Blackman, APRN, CNP  [facility]   [phone]              "

## 2021-08-02 NOTE — PROGRESS NOTES
"[facility] Medicine PROGRESS NOTE      Identification/Summary: Goldy Peraza is 66 year old male with past medical history of CVA with right-sided weakness, expressive aphasia, hypertension depression who was admitted on 7/29/2021 for UTI, acute encephalopathy.  On ceftriaxone. urine culture fluoroquinolone resistant E. coli.  Change to cefdinir, doing well. Will need acute rehab at discharge.  Son was updated 8/1. Awaiting placement.  He h has incidental findings of complex right kidney cyst, mediastinal adenopathy, splenic infarct on imaging that needs follow-up as outpatient.    Assessment and Plan:  1. UTI --   Fevers resolving  History of BPH   6.2cm complex right kidney cyst On CT   bladder scan postvoid minimal.  Urology consulted for kidney cyst.  MRI right kidney 5.62 cm cyst with septations  Follow-up with urology as outpatient as scheduled  Urine culture fluoroquinolone resistant E. coli  Change antibiotics to cefdinir    2. Has right foot pain for \"months\" - no fracture on XR. And RLE US showed no DVT  3.  Acute metabolic encephalopathy    Head CT was negative.  Holding parameters written for gabapentin, baclofen  No new focal neurological deficits    4. history of CVA with right sided residual weakness, some aphasia, and history of epilepsy  On aspirin, statin, baclofen  5. On chronic marijuana use per records - unclear indications  But records indicate chronic right knee and hip pain   6. HTN   7. History of Pulmonary fibrosis - prior imagings ? ILD. Our chest imaging here showed pneumonitis -- but patient doesn't have any respiratory complaints.   Has mediastinal hilar adenopathy on CT  Will need repeat imaging in 2 months    8.small splenic infarct --- patient is not complaining of pain on the left abdomen or flank. Lactic acid is normal.  Echocardiogram no evidence of any cardiac thrombus.  Follow-up with hematology as outpatient    Thrombocytopenia  Likely related to acute " "illness  Resolved    Diet: Combination Diet Regular Diet Adult  DVT Prophylaxis:  LOVENOX  Code Status: Full Code    Anticipated possible discharge in 1 days once placement milestones met    Interval History/Subjective:  Denies shortness of breath, chest pain. Denied any abdominal pain. No nausea vomiting.     Physical Exam/Objective:  Vitals I/O   Vital signs:  Temp: 97.6  F (36.4  C) Temp src: Oral BP: (!) 159/84 Pulse: 54   Resp: 18 SpO2: 97 % O2 Device: None (Room air) Oxygen Delivery: 2 LPM Height: 177.8 cm (5' 10\") Weight: 91.6 kg (202 lb)  Estimated body mass index is 28.98 kg/m  as calculated from the following:    Height as of this encounter: 1.778 m (5' 10\").    Weight as of this encounter: 91.6 kg (202 lb).       I/O last 3 completed shifts:  In: 4125 [P.O.:1200; I.V.:2925]  Out: 2450 [Urine:2450]     Body mass index is 28.98 kg/m .    General Appearance:  Alert, cooperative, no distress   Head:  Normocephalic, atraumatic   Eyes:  PERRL    Throat:  mucosa; moist   Neck: No JVD, thyromegaly   Lungs:    Rales at the bases bilaterally, respirations unlabored   Chest Wall:  No tenderness or deformity   Heart:  Regular rate and rhythm, S1, S2 normal,no murmur   Abdomen:   Soft, tender in suprapubic area, non distended, bowel sounds present, no guarding or rigidity   Extremities:  Trace edema in both lower extremities, no joint swelling   Skin: Skin color, texture, turgor normal, no rashes or lesions   Neurologic: Alert and oriented X 3, chronic right-sided weakness       Medications:   Personally Reviewed.    alfuzosin ER  10 mg Oral Daily     aspirin  325 mg Oral Daily     atorvastatin  40 mg Oral At Bedtime     Baclofen  20 mg Oral TID     calcium carbonate-vitamin D  2 tablet Oral Daily     cefdinir  300 mg Oral Q12H PARISH     divalproex sodium extended-release  1,000 mg Oral QAM     divalproex sodium extended-release  1,500 mg Oral At Bedtime     enoxaparin ANTICOAGULANT  40 mg Subcutaneous Q24H     " gabapentin  300 mg Oral TID     lisinopril  30 mg Oral Daily     multivitamin w/minerals  1 tablet Oral Daily     pantoprazole  40 mg Oral QAM AC     sodium chloride (PF)  3 mL Intracatheter Q8H     sodium chloride (PF)  3 mL Intracatheter Q8H     vitamin C  500 mg Oral Daily     cholecalciferol  25 mcg Oral Daily       Data reviewed today: I personally reviewed all new medications, labs, imaging/diagnostics reports over the past 24 hours. Pertinent findings include    Labs:  Most Recent 3 CBC's:  Recent Labs   Lab Test 21  0555 21  0753 07/30/21  0921  1835   WBC  --  5.2 4.7 5.2   HGB  --  11.7* 11.6* 11.8*   MCV  --  101* 100 98    138* 135* 145*     Most Recent 3 BMP's:  Recent Labs   Lab Test 21  0555 21  0440 21  1835   NA  --   --  138 137 136   POTASSIUM 3.8 3.6 4.0 3.9  3.9 3.9   CHLORIDE  --   --  109* 105 103   CO2  --   --  23 23 23   BUN  --   --  15 17 21   CR 0.72  --  0.75 0.70 0.76   ANIONGAP  --   --  6 9 10   ELMA  --   --  8.1* 8.4* 8.8   GLC  --   --  80 74 87     Most Recent 6 Bacteria Isolates From Any Culture (See EPIC Reports for Culture Details):No lab results found.  Most Recent TSH and T4:  Recent Labs   Lab Test 21  0645   TSH 2.48       Imaging:   Recent Results (from the past 24 hour(s))   Echocardiogram Complete   Result Value    LVEF  60-65%    Narrative    [id number]  [id number]  [id number]  [id number]^ANITRA^[place] [address]     Name: LOBO HIGH  MRN: [id number]  : 1955  Study Date: 2021 01:52 PM  Age: 66 yrs  Gender: Male  Patient Location: [facility]  Reason For Study: Other, Please Specify in Comments  Ordering Physician: TARIQ AYOUB  Performed By: Camilo Mendez     BSA: 2.1 m2  Height: 70 in  Weight: 200  lb  ______________________________________________________________________________  ______________________________________________________________________________  Interpretation Summary     Left ventricular function is normal.The ejection fraction is 60-65%.  There is normal left ventricular wall thickness.  Normal left ventricular wall motion  Suspect trileaflet valve but difficult to see individual leaflets. Cannot  exclude bicuspid valve.  ______________________________________________________________________________  Left Ventricle  Left ventricular function is normal.The ejection fraction is 60-65%. The left  ventricle is normal in size. There is normal left ventricular wall thickness.  Diastolic Doppler findings (E/E' ratio and/or other parameters) suggest left  ventricular filling pressures are indeterminate. Normal left ventricular wall  motion.     Right Ventricle  Normal right ventricle size and systolic function. TAPSE is normal, which is  consistent with normal right ventricular systolic function.     Atria  Normal left atrial size. Right atrial size is normal.     Mitral Valve  The mitral valve leaflets appear thickened, but open well. There is trace to  mild mitral regurgitation.     Tricuspid Valve  Tricuspid valve leaflets appear normal. There is trace to mild tricuspid  regurgitation. There is no tricuspid stenosis.     Aortic Valve  Suspect trileaflet valve but difficult to see individual leaflets. Cannot  exclude bicuspid valve. There is mild (1+) aortic regurgitation. No aortic  stenosis is present.     Pulmonic Valve  The pulmonic valve is not well visualized. There is no pulmonic valvular  regurgitation. There is no pulmonic valvular stenosis.     Vessels  The aorta root is normal.     Pericardium  There is no pericardial effusion.     Rhythm  Sinus rhythm was noted.  ______________________________________________________________________________  MMode/2D Measurements & Calculations  IVSd: 1.1  cm  LVIDd: 4.9 cm  LVIDs: 2.9 cm  LVPWd: 1.0 cm  FS: 41.9 %  LV mass(C)d: 197.4 grams  LV mass(C)dI: 94.7 grams/m2  Ao root diam: 3.0 cm  LA dimension: 4.3 cm  LA/Ao: 1.5  LVOT diam: 2.0 cm  LVOT area: 3.1 cm2  LA Volume Indexed (AL/bp): 19.3 ml/m2  TAPSE: 3.0 cm     Time Measurements  MM HR: 72.0 BPM     Doppler Measurements & Calculations  MV E max mandy: 74.7 cm/sec  MV A max mandy: 84.4 cm/sec  MV E/A: 0.89  MV dec time: 0.18 sec  AI P1/2t: 607.8 msec  LV V1 max P.5 mmHg  LV V1 max: 106.2 cm/sec  LV V1 VTI: 23.5 cm  SV(LVOT): 74.1 ml  SI(LVOT): 35.5 ml/m2  PA acc time: 0.08 sec  E/E' av.2  Lateral E/e': 9.4  Medial E/e': 7.1     ______________________________________________________________________________  Report approved by: Dallas Lombardi 2021 03:02 PM         MR Renal wo & w Contrast    Narrative    EXAM: MR RENAL WO and W CONTRAST  LOCATION: [facility]  DATE/TIME: 2021 4:56 PM    INDICATION: right kidney cyst on CT ABDOMEN  COMPARISON: CT 2021  TECHNIQUE: Routine MRI renal protocol including T1 in/out phase, diffusion, multiplane T2, and dynamic T1 with IV contrast.  CONTRAST: 9 mL Gadavist    FINDINGS:    RIGHT KIDNEY: Multiple cystic lesions including a dominant 4.9 x 5.2 cm exophytic lower pole lesion with multiple thin internal nonenhancing septations. Adjacent 2.0 cm simple peripelvic cyst. Subcentimeter upper pole cortical cysts. No suspicious   enhancing lesion. No hydronephrosis.    LEFT KIDNEY: Multiple small simple cysts including a dominant upper pole cyst measuring 2.0 cm. No suspicious enhancing lesion. No hydronephrosis.    ADDITIONAL FINDINGS: Multiple small simple hepatic cysts with a dominant upper right lobe cyst measuring 1.5 cm. Contracted gallbladder. No biliary ductal dilatation. Unremarkable spleen, pancreas and adrenals. No free fluid or adenopathy.      Impression    IMPRESSION:    1. Multiple bilateral renal cysts including a  mildly complex 5.2 cm Bosniak category 2F right renal cyst with multiple thin internal nonenhancing septations. Recommend 6 month ultrasound, CT or MR follow-up.  2. Multiple bilateral simple renal cysts. No follow-up of these simple cysts is needed.  3. Multiple incidental simple hepatic cysts.         Reshma Agarwal MD  Hospitalist  [facility]

## 2021-08-02 NOTE — PLAN OF CARE
Alert and oriented by three. Mild pain overnight. Slept well. Q 2 turns. NS running at 75ml hour. Oral abx. On aspiration precautions. Purewick overnight. Post void bladder scan 169. Tolerates oral medications with thin liquids. On RA. Afebrile. On Mg and K protocols. Recheck in the AM.     Problem: UTI (Urinary Tract Infection)  Goal: Improved Infection Symptoms  Outcome: Improving     Problem: Gas Exchange Impaired  Goal: Optimal Gas Exchange  Outcome: Improving

## 2021-08-03 ENCOUNTER — APPOINTMENT (OUTPATIENT)
Dept: PHYSICAL THERAPY | Facility: CLINIC | Age: 66
DRG: 689 | End: 2021-08-03
Payer: MEDICARE

## 2021-08-03 LAB
HOLD SPECIMEN: NORMAL
MAGNESIUM SERPL-MCNC: 1.8 MG/DL (ref 1.8–2.6)
POTASSIUM BLD-SCNC: 4.2 MMOL/L (ref 3.5–5)

## 2021-08-03 PROCEDURE — 36415 COLL VENOUS BLD VENIPUNCTURE: CPT | Performed by: INTERNAL MEDICINE

## 2021-08-03 PROCEDURE — 97530 THERAPEUTIC ACTIVITIES: CPT | Mod: GP

## 2021-08-03 PROCEDURE — 99232 SBSQ HOSP IP/OBS MODERATE 35: CPT | Performed by: INTERNAL MEDICINE

## 2021-08-03 PROCEDURE — 250N000013 HC RX MED GY IP 250 OP 250 PS 637: Performed by: INTERNAL MEDICINE

## 2021-08-03 PROCEDURE — 83735 ASSAY OF MAGNESIUM: CPT | Performed by: INTERNAL MEDICINE

## 2021-08-03 PROCEDURE — 120N000001 HC R&B MED SURG/OB

## 2021-08-03 PROCEDURE — 84132 ASSAY OF SERUM POTASSIUM: CPT | Performed by: INTERNAL MEDICINE

## 2021-08-03 PROCEDURE — 97116 GAIT TRAINING THERAPY: CPT | Mod: GP

## 2021-08-03 PROCEDURE — 250N000011 HC RX IP 250 OP 636: Performed by: INTERNAL MEDICINE

## 2021-08-03 RX ADMIN — ATORVASTATIN CALCIUM 40 MG: 40 TABLET, FILM COATED ORAL at 21:14

## 2021-08-03 RX ADMIN — CEFDINIR 300 MG: 300 CAPSULE ORAL at 20:20

## 2021-08-03 RX ADMIN — BACLOFEN 20 MG: 10 TABLET ORAL at 13:58

## 2021-08-03 RX ADMIN — CEFDINIR 300 MG: 300 CAPSULE ORAL at 09:27

## 2021-08-03 RX ADMIN — PANTOPRAZOLE SODIUM 40 MG: 20 TABLET, DELAYED RELEASE ORAL at 06:53

## 2021-08-03 RX ADMIN — ACETAMINOPHEN 650 MG: 325 TABLET ORAL at 20:20

## 2021-08-03 RX ADMIN — GABAPENTIN 300 MG: 300 CAPSULE ORAL at 09:29

## 2021-08-03 RX ADMIN — GABAPENTIN 300 MG: 300 CAPSULE ORAL at 13:58

## 2021-08-03 RX ADMIN — LISINOPRIL 30 MG: 20 TABLET ORAL at 09:27

## 2021-08-03 RX ADMIN — MULTIPLE VITAMINS W/ MINERALS TAB 1 TABLET: TAB at 09:28

## 2021-08-03 RX ADMIN — ASPIRIN 325 MG ORAL TABLET 325 MG: 325 PILL ORAL at 09:28

## 2021-08-03 RX ADMIN — OXYCODONE HYDROCHLORIDE AND ACETAMINOPHEN 500 MG: 500 TABLET ORAL at 09:28

## 2021-08-03 RX ADMIN — ENOXAPARIN SODIUM 40 MG: 100 INJECTION SUBCUTANEOUS at 11:03

## 2021-08-03 RX ADMIN — ACETAMINOPHEN 650 MG: 325 TABLET ORAL at 00:08

## 2021-08-03 RX ADMIN — BACLOFEN 20 MG: 10 TABLET ORAL at 09:26

## 2021-08-03 RX ADMIN — ALFUZOSIN HYDROCHLORIDE 10 MG: 10 TABLET ORAL at 09:27

## 2021-08-03 RX ADMIN — Medication 25 MCG: at 09:29

## 2021-08-03 RX ADMIN — DIVALPROEX SODIUM 1500 MG: 500 TABLET, EXTENDED RELEASE ORAL at 21:14

## 2021-08-03 RX ADMIN — BACLOFEN 20 MG: 10 TABLET ORAL at 20:20

## 2021-08-03 RX ADMIN — Medication 2 TABLET: at 09:28

## 2021-08-03 RX ADMIN — GABAPENTIN 300 MG: 300 CAPSULE ORAL at 20:20

## 2021-08-03 RX ADMIN — ACETAMINOPHEN 650 MG: 325 TABLET ORAL at 10:57

## 2021-08-03 RX ADMIN — DIVALPROEX SODIUM 1000 MG: 500 TABLET, EXTENDED RELEASE ORAL at 09:27

## 2021-08-03 ASSESSMENT — MIFFLIN-ST. JEOR: SCORE: 1691.17

## 2021-08-03 NOTE — PLAN OF CARE
Problem: Adult Inpatient Plan of Care  Goal: Optimal Comfort and Wellbeing  Outcome: Improving   Pt stated headache twice this evening. Was given PRN Tyl first with some effect and then order dose of T3.  Pt now resting comfortably.     Problem: Adult Inpatient Plan of Care  Goal: Readiness for Transition of Care  Outcome: Improving   Pt tolerating PO antibiotics. Urinary symptoms seem to be resolving. Was inc of urine this shift with some hesitency when trying to void.  this evening.

## 2021-08-03 NOTE — PROGRESS NOTES
"[facility]  Chart Note  08/03/21    S: Chart review shows bladder scan 26 ml overnight. Remains afebrile.     O: /75 (BP Location: Right arm)   Pulse 80   Temp 97.8  F (36.6  C) (Oral)   Resp 20   Ht 1.778 m (5' 10\")   Wt 91 kg (200 lb 9.6 oz)   SpO2 95%   BMI 28.78 kg/m      IMAGING:  EXAM: MR RENAL WO and W CONTRAST  LOCATION: [facility]  DATE/TIME: 8/1/2021 4:56 PM     INDICATION: right kidney cyst on CT ABDOMEN  COMPARISON: CT 07/29/2021  TECHNIQUE: Routine MRI renal protocol including T1 in/out phase, diffusion, multiplane T2, and dynamic T1 with IV contrast.  CONTRAST: 9 mL Gadavist     FINDINGS:     RIGHT KIDNEY: Multiple cystic lesions including a dominant 4.9 x 5.2 cm exophytic lower pole lesion with multiple thin internal nonenhancing septations. Adjacent 2.0 cm simple peripelvic cyst. Subcentimeter upper pole cortical cysts. No suspicious   enhancing lesion. No hydronephrosis.     LEFT KIDNEY: Multiple small simple cysts including a dominant upper pole cyst measuring 2.0 cm. No suspicious enhancing lesion. No hydronephrosis.     ADDITIONAL FINDINGS: Multiple small simple hepatic cysts with a dominant upper right lobe cyst measuring 1.5 cm. Contracted gallbladder. No biliary ductal dilatation. Unremarkable spleen, pancreas and adrenals. No free fluid or adenopathy.                                                              IMPRESSION:     1. Multiple bilateral renal cysts including a mildly complex 5.2 cm Bosniak category 2F right renal cyst with multiple thin internal nonenhancing septations. Recommend 6 month ultrasound, CT or MR follow-up.  2. Multiple bilateral simple renal cysts. No follow-up of these simple cysts is needed.  3. Multiple incidental simple hepatic cysts.    A/P: 66yr old male with UTI and complex renal cyst.   - [facility] will call patient to arrange follow up. Contact info already in discharge orders.   - [facility] will sign off. Please " re-consult with any new or worsening urologic concerns.  Eleuterio Blackman, APRN, CNP

## 2021-08-03 NOTE — PROGRESS NOTES
Care Management Follow Up    Length of Stay (days): 5    Expected Discharge Date: 08/04/2021     Concerns to be Addressed:       Patient plan of care discussed at interdisciplinary rounds: Yes    Anticipated Discharge Disposition:       Anticipated Discharge Services:    Anticipated Discharge DME:      Patient/family educated on Medicare website which has current facility and service quality ratings:    Education Provided on the Discharge Plan:    Patient/Family in Agreement with the Plan:      Referrals Placed by CM/SW:    Private pay costs discussed: Not applicable    Additional Information:  Following pt for TCU placement, [facility]-no appropriate bed for pt at either facility.  Met with pt and his sister and also spoke to pt's son by phone, updated on TCU referrals and need for more TCU options.  Pt and family will be discussing and will contact Care Manager with more TCU options.      KRISH Vidal

## 2021-08-03 NOTE — PLAN OF CARE
Problem: Pain Acute  Goal: Acceptable Pain Control and Functional Ability  Outcome: No Change   Pt complained of 7/10 back pain. PRN tylenol w/codeine given and was effective with a follow up pain of 2/10.      Problem: Fall Injury Risk  Goal: Absence of Fall and Fall-Related Injury  Outcome: No Change   Pt assist x1-2. Q2 turns. Pt can call appropriately and make needs known. Call light within reach.      Problem: Adult Inpatient Plan of Care  Goal: Plan of Care Review  Outcome: No Change   K and Mg protocol. K 4.2 and Mg 1.8 with recheck on 8/4/21 in the morning. Pt awaiting TCU placement.

## 2021-08-03 NOTE — PLAN OF CARE
Problem: Adult Inpatient Plan of Care  Goal: Plan of Care Review  Outcome: Improving     Problem: UTI (Urinary Tract Infection)  Goal: Improved Infection Symptoms  Outcome: Improving     Prn acetaminophen given for c/o H/A tonight.   Turned and repositioned q2h  Patient incontinent of urine, external male catheter replaced. Voiding well tonight. Bladder scan 26ml.   Left PIV's saline locked and flushed without complication  Continues on K+ and mag protocols with rechecks scheduled for this morning  On aspiration precautions

## 2021-08-03 NOTE — PROGRESS NOTES
[facility] Daily Progress Note    Assessment/Plan:  66-year-old male [place]  with history of CVA with right-sided weakness, expressive aphasia.  Also with history of hypertension and depression who was admitted on July 29, 2021 for urinary tract infection, and acute encephalopathy.  Urine culture later grew fluoroquinolone resistant E. coli.  Patient has tolerated cefdinir.  He had incidental finding of complex right kidney cyst, mediastinal adenopathy and splenic infarct on imaging.  Urology is planning to follow with repeat ultrasound CT or MRI in 6 months.  Dr. Agarwal recommended hematology follow-up as outpatient for small splenic infarct.  We are awaiting TCU placement.  Eventually patient would request long-term care.    Urinary tract infection secondary to E. Coli  Urine culture with fluoroquinolone resistant E. coli.  6.2 cm complex right kidney cyst on CT  [place] [facility] follow-up as documented above.  Continue cefdinir for total of 7 days at discharge.    Acute metabolic encephalopathy  Head CT negative  Seems to have resolved with treatment of UTI.    History of CVA  Right-sided residual weakness, expressive aphasia.  History of epilepsy.  Continue aspirin statin baclofen as ordered.    History of chronic marijuana use.  Essential hypertension  Pulmonary fibrosis  Mediastinal and hilar adenopathy on CT  Recommend repeat CT scan in 2 months.    Splenic infarct  Thrombocytopenia  Echocardiogram showed no evidence of acute thrombus.  Follow-up as outpatient with hematology.    Active Problems:    Pneumonitis    Urinary tract infection without hematuria, site unspecified     LOS: 5 days     Barriers to discharge: Awaiting TCU placement.  Discharge Disposition: TCU    Subjective:  Goldy is sitting up in chair.  He denies any new's complaints.  His Sister Bri is present.  We had long conversation about long-term care goals.  Goldy feels that he is likely ready for long-term care.  He is agreeable to  TCU placement initially.  He denies any headache or vision changes.  No fever or chills.  No nausea or vomiting.  Tolerating diet.  He is participating with physical therapy.      alfuzosin ER  10 mg Oral Daily     aspirin  325 mg Oral Daily     atorvastatin  40 mg Oral At Bedtime     Baclofen  20 mg Oral TID     calcium carbonate-vitamin D  2 tablet Oral Daily     cefdinir  300 mg Oral Q12H PARISH     divalproex sodium extended-release  1,000 mg Oral QAM     divalproex sodium extended-release  1,500 mg Oral At Bedtime     enoxaparin ANTICOAGULANT  40 mg Subcutaneous Q24H     gabapentin  300 mg Oral TID     lisinopril  30 mg Oral Daily     multivitamin w/minerals  1 tablet Oral Daily     pantoprazole  40 mg Oral QAM AC     sodium chloride (PF)  3 mL Intracatheter Q8H     sodium chloride (PF)  3 mL Intracatheter Q8H     vitamin C  500 mg Oral Daily     cholecalciferol  25 mcg Oral Daily       Objective:  Vital signs in last 24 hours:  Temp:  [97.3  F (36.3  C)-98.2  F (36.8  C)] 98.2  F (36.8  C)  Pulse:  [66-80] 78  Resp:  [18-20] 18  BP: (115-139)/(74-82) 124/82  SpO2:  [93 %-96 %] 93 %  Weight:   Weight:   @THISENCWEIGHTS(1)@  Weight change: -0.635 kg (-1 lb 6.4 oz)  Body mass index is 28.78 kg/m .    Intake/Output last 3 shifts:  I/O last 3 completed shifts:  In: 680 [P.O.:680]  Out: 2275 [Urine:2275]  Intake/Output this shift:  I/O this shift:  In: 680 [P.O.:680]  Out: 275 [Urine:275]    Review of Systems:   As per subjective, all others negative.    Physical Exam:    GENERAL:  Alert, appears comfortable, in no acute distress, appears stated age   HEAD:  Normocephalic, without obvious abnormality, atraumatic   EYES:  PERRL, conjunctiva/corneas clear, no scleral icterus, EOM's intact   NOSE: Nares normal, septum midline, mucosa normal, no drainage   THROAT: Lips, mucosa, and tongue normal; teeth and gums normal, mouth moist   NECK: Supple, symmetrical, trachea midline   BACK:   Symmetric, no curvature, ROM normal    LUNGS:    Diminished breath sounds at bases, rales, no rhonchi, or wheezing, symmetric chest rise on inhalation, respirations unlabored   CHEST WALL:  No tenderness or deformity   HEART:  Regular rate and rhythm, S1 and S2 normal, no murmur, rub, or gallop    ABDOMEN:   Soft, non-tender, bowel sounds active all four quadrants, no masses, no organomegaly, no rebound or guarding   EXTREMITIES:  Trace pedal edema, no joint swelling.   SKIN: Dry to touch, no exanthems in the visualized areas   NEURO: Alert, oriented x3, moves all four extremities freely, right upper and lower extremity weakness.   PSYCH: Cooperative, behavior is appropriate      Cardiographics:   I personally reviewed.  Echocardiogram:   Left ventricular function is normal.The ejection fraction is 60-65%.  There is normal left ventricular wall thickness.  Normal left ventricular wall motion  Suspect trileaflet valve but difficult to see individual leaflets. Cannot  exclude bicuspid valve.    Imaging:  Personally Reviewed.  Results for orders placed or performed during the hospital encounter of 07/29/21   Foot  XR, G/E 3 views, right    Impression    IMPRESSION:   Moderate hallux valgus. Diffuse osteopenia. No evidence of acute fracture. Mild first MTP joint degenerative changes.   CT Chest Pulmonary Embolism w Contrast    Impression    IMPRESSION:  1.  Negative for pulmonary embolism. Enlargement of the central pulmonary arteries suggest pulmonary arterial hypertension.  2.  Interstitial fibrotic change in the periphery of the lungs.  3.  Mild patchy scattered groundglass opacities in the periphery of the lungs are nonspecific and could be seen with a mild or low-grade pneumonitis. Clinical correlation.  4.  Mild bilateral hilar and mediastinal adenopathy. This may be reactive in nature but follow-up CT in 3 months is recommended for reevaluation to exclude any progressive process.  5.  Marked coronary artery calcification.   CT Abdomen Pelvis w  Contrast    Impression    IMPRESSION:   1.  Focal area of peripheral decreased enhancement in the spleen medially is concerning for a small acute splenic infarct.  2.  Partially exophytic complex cyst in the right kidney lower pole measuring 6.2 cm with multiple septations. A cystic renal cell neoplasm cannot be excluded. An MRI of the kidneys without and with IV gadolinium is recommended in the near future.  3.  Multiple low-attenuation lesions in the liver. These may represent benign cysts and can be reassessed at the time of the renal MRI.  4.  Moderate changes of pulmonary fibrosis in both lung bases.  5.  Diffuse atheromatous plaque in the abdominal aorta.   Head CT w/o contrast    Impression    IMPRESSION:  1.  No acute intracranial process.   US Lower Extremity Venous Duplex Right    Impression    IMPRESSION:  1.  No deep venous thrombosis in the right lower extremity.   MR Renal wo & w Contrast    Impression    IMPRESSION:    1. Multiple bilateral renal cysts including a mildly complex 5.2 cm Bosniak category 2F right renal cyst with multiple thin internal nonenhancing septations. Recommend 6 month ultrasound, CT or MR follow-up.  2. Multiple bilateral simple renal cysts. No follow-up of these simple cysts is needed.  3. Multiple incidental simple hepatic cysts.   Echocardiogram Complete   Result Value Ref Range    LVEF  60-65%        Lab Results:  Personally Reviewed.   Recent Labs   Lab 08/02/21  0555 07/31/21  0753 07/30/21  0928 07/29/21  1835   WBC  --  5.2 4.7 5.2   HGB  --  11.7* 11.6* 11.8*   HCT  --  35.5* 34.6* 35.1*    138* 135* 145*     Recent Labs   Lab 07/31/21  0753 07/30/21  0928 07/29/21  1835    137 136   CO2 23 23 23   BUN 15 17 21   ALBUMIN  --  2.4* 2.7*   ALKPHOS  --  51 57   ALT  --  18 14   AST  --  26 22     No results for input(s): INR in the last 168 hours.    I reviewed all labs and imaging studies as of this date and I reviewed all current inpatient medications and  updated them    Shola Emery DO, MS  [facility] Service  Internal Medicine

## 2021-08-04 ENCOUNTER — APPOINTMENT (OUTPATIENT)
Dept: OCCUPATIONAL THERAPY | Facility: CLINIC | Age: 66
DRG: 689 | End: 2021-08-04
Payer: MEDICARE

## 2021-08-04 ENCOUNTER — APPOINTMENT (OUTPATIENT)
Dept: PHYSICAL THERAPY | Facility: CLINIC | Age: 66
DRG: 689 | End: 2021-08-04
Payer: MEDICARE

## 2021-08-04 LAB
MAGNESIUM SERPL-MCNC: 1.9 MG/DL (ref 1.8–2.6)
POTASSIUM BLD-SCNC: 4.5 MMOL/L (ref 3.5–5)

## 2021-08-04 PROCEDURE — 97535 SELF CARE MNGMENT TRAINING: CPT | Mod: GO

## 2021-08-04 PROCEDURE — 83735 ASSAY OF MAGNESIUM: CPT | Performed by: FAMILY MEDICINE

## 2021-08-04 PROCEDURE — 97116 GAIT TRAINING THERAPY: CPT | Mod: GP

## 2021-08-04 PROCEDURE — 84132 ASSAY OF SERUM POTASSIUM: CPT | Performed by: INTERNAL MEDICINE

## 2021-08-04 PROCEDURE — 99232 SBSQ HOSP IP/OBS MODERATE 35: CPT | Performed by: FAMILY MEDICINE

## 2021-08-04 PROCEDURE — 250N000011 HC RX IP 250 OP 636: Performed by: INTERNAL MEDICINE

## 2021-08-04 PROCEDURE — 250N000013 HC RX MED GY IP 250 OP 250 PS 637: Performed by: INTERNAL MEDICINE

## 2021-08-04 PROCEDURE — 120N000001 HC R&B MED SURG/OB

## 2021-08-04 PROCEDURE — 250N000013 HC RX MED GY IP 250 OP 250 PS 637: Performed by: EMERGENCY MEDICINE

## 2021-08-04 PROCEDURE — 36415 COLL VENOUS BLD VENIPUNCTURE: CPT | Performed by: INTERNAL MEDICINE

## 2021-08-04 RX ADMIN — OXYCODONE HYDROCHLORIDE AND ACETAMINOPHEN 500 MG: 500 TABLET ORAL at 09:52

## 2021-08-04 RX ADMIN — BACLOFEN 20 MG: 10 TABLET ORAL at 09:52

## 2021-08-04 RX ADMIN — ACETAMINOPHEN 650 MG: 325 TABLET ORAL at 16:37

## 2021-08-04 RX ADMIN — ACETAMINOPHEN AND CODEINE PHOSPHATE 1 HALF-TAB: 300; 30 TABLET ORAL at 22:55

## 2021-08-04 RX ADMIN — CEFDINIR 300 MG: 300 CAPSULE ORAL at 09:52

## 2021-08-04 RX ADMIN — GABAPENTIN 300 MG: 300 CAPSULE ORAL at 13:46

## 2021-08-04 RX ADMIN — ENOXAPARIN SODIUM 40 MG: 100 INJECTION SUBCUTANEOUS at 09:53

## 2021-08-04 RX ADMIN — ALFUZOSIN HYDROCHLORIDE 10 MG: 10 TABLET ORAL at 09:53

## 2021-08-04 RX ADMIN — BACLOFEN 20 MG: 10 TABLET ORAL at 20:23

## 2021-08-04 RX ADMIN — PANTOPRAZOLE SODIUM 40 MG: 20 TABLET, DELAYED RELEASE ORAL at 06:30

## 2021-08-04 RX ADMIN — ASPIRIN 325 MG ORAL TABLET 325 MG: 325 PILL ORAL at 09:52

## 2021-08-04 RX ADMIN — ATORVASTATIN CALCIUM 40 MG: 40 TABLET, FILM COATED ORAL at 20:22

## 2021-08-04 RX ADMIN — GABAPENTIN 300 MG: 300 CAPSULE ORAL at 09:52

## 2021-08-04 RX ADMIN — Medication 2 TABLET: at 09:53

## 2021-08-04 RX ADMIN — CEFDINIR 300 MG: 300 CAPSULE ORAL at 20:23

## 2021-08-04 RX ADMIN — MULTIPLE VITAMINS W/ MINERALS TAB 1 TABLET: TAB at 09:51

## 2021-08-04 RX ADMIN — Medication 25 MCG: at 09:51

## 2021-08-04 RX ADMIN — GABAPENTIN 300 MG: 300 CAPSULE ORAL at 20:22

## 2021-08-04 RX ADMIN — LISINOPRIL 30 MG: 20 TABLET ORAL at 09:52

## 2021-08-04 RX ADMIN — BACLOFEN 20 MG: 10 TABLET ORAL at 13:46

## 2021-08-04 RX ADMIN — DIVALPROEX SODIUM 1000 MG: 500 TABLET, EXTENDED RELEASE ORAL at 09:52

## 2021-08-04 RX ADMIN — DIVALPROEX SODIUM 1500 MG: 500 TABLET, EXTENDED RELEASE ORAL at 20:23

## 2021-08-04 NOTE — PLAN OF CARE
Problem: Adult Inpatient Plan of Care  Goal: Plan of Care Review  Outcome: Improving  A&O, some trouble word finding, at baseline.     Problem: Adult Inpatient Plan of Care  Goal: Optimal Comfort and Wellbeing  Outcome: Improving  Agreeable to plan of care. Denies significant pain. Tolerates regular diet. Last BM today.    Problem: Adult Inpatient Plan of Care  Goal: Readiness for Transition of Care  Outcome: Improving  Placement pending at TCU.    Charlette Watson RN

## 2021-08-04 NOTE — PROGRESS NOTES
Care Management Follow Up    Length of Stay (days): 6    Expected Discharge Date: 08/05/2021     Concerns to be Addressed:     Placement  Patient plan of care discussed at interdisciplinary rounds: Yes    Anticipated Discharge Disposition:  TCU     Anticipated Discharge Services:  TCU  Anticipated Discharge DME:  To be determined    Patient/family educated on Medicare website which has current facility and service quality ratings:  yes  Education Provided on the Discharge Plan:  Plan pending  Patient/Family in Agreement with the Plan:  Plan pending    Referrals Placed by CM/SW:  TCU referrals sent  Private pay costs discussed: Not applicable    Additional Information:  Son provided additional TCU preferences. Additional referrals were sent to [facility], [facility], [facility], and [facility]; referrals faxed. [facility] declined stating no appropriate bed. [facility] declined due to smoker status. Left messages with call back extension [phone] for [facility] and [facility].     Previously [facility] and [facility] TCU's declined due to no appropriate bed.     3:39 PM Physical Therapist spoke with patient during therapy session and inquired if patient currently smokes. Patient reports he does not smoke anymore. Updated patients chart. Left message at [facility] TCU to see if they would now be able to accept the patient; provided call back extension [phone].    Corazon Adler RN

## 2021-08-04 NOTE — PLAN OF CARE
Problem: Pain Acute  Goal: Acceptable Pain Control and Functional Ability  Outcome: Improving     Problem: Adult Inpatient Plan of Care  Goal: Absence of Hospital-Acquired Illness or Injury  Intervention: Identify and Manage Fall Risk  Recent Flowsheet Documentation  Taken 8/3/2021 4890 by Zandra Ashford, RN  Safety Promotion/Fall Prevention:   activity supervised   bed alarm on   clutter free environment maintained   fall prevention program maintained   room door open   room organization consistent    Patient denied having any pain on shift. Patient is saline locked, both IV's patent. Calls appropriately. Patient is waiting for TCU placement.

## 2021-08-04 NOTE — PROGRESS NOTES
[facility] MEDICINE PROGRESS NOTE      Assessment/Plan:  66-year-old male [place]  with history of CVA with right-sided weakness, expressive aphasia.  Also with history of hypertension and depression who was admitted on July 29, 2021 for urinary tract infection, and acute encephalopathy.  Urine culture later grew fluoroquinolone resistant E. coli.  Patient has tolerated cefdinir.  He had incidental finding of complex right kidney cyst, mediastinal adenopathy and splenic infarct on imaging.  Urology is planning to follow with repeat ultrasound CT or MRI in 6 months.  Dr. Agarwal recommended hematology follow-up as outpatient for small splenic infarct.  We are awaiting TCU placement.  Eventually patient would request long-term care.  He seems to be getting closer to his baseline.     Urinary tract infection secondary to E. Coli  Urine culture with fluoroquinolone resistant E. coli.  6.2 cm complex right kidney cyst on CT  [place] [facility] follow-up as documented above.  Continue cefdinir for total of 7 days at discharge.     Acute metabolic encephalopathy  Head CT negative  Seems to have resolved with treatment of UTI.     History of CVA  Right-sided residual weakness, expressive aphasia.  History of epilepsy.  Continue aspirin statin baclofen as ordered.     History of chronic marijuana use.  Essential hypertension  Pulmonary fibrosis  Mediastinal and hilar adenopathy on CT  Recommend repeat CT scan in 2 months.     Splenic infarct  Thrombocytopenia  Echocardiogram showed no evidence of acute thrombus.  Follow-up as outpatient with hematology.     Active Problems:    Pneumonitis    Urinary tract infection without hematuria, site unspecified      LOS: 5 days      Barriers to discharge: Awaiting TCU placement.  Discharge Disposition: TCU     Subjective:  Patient reports that he is doing quite well today.  He feels a bit short of breath with activity.  Some right-sided neck pain  this morning but that is resolved now he has had that intermittently in the past.  No nausea.  Eating well.  No other concerns or issues.  He is agreeable to TCU.  His Sister Bri who was present yesterday is also agreeable to that.  We are still awaiting a bed.     Physical Exam/Objective:  Temp:  [97.3  F (36.3  C)-97.4  F (36.3  C)] 97.3  F (36.3  C)  Pulse:  [18-62] 18  Resp:  [16-78] 78  BP: (113-131)/(69-85) 113/69  SpO2:  [93 %-96 %] 96 %    Body mass index is 28.78 kg/m .    GENERAL:  Alert, appears comfortable, in no acute distress, appears stated age   HEAD:  Normocephalic, without obvious abnormality, atraumatic   LUNGS:   Clear to auscultation bilaterally ethics consult ethics consult family request   CHEST WALL:  No tenderness or deformity   HEART:  Regular rate and rhythm, S1 and S2 normal, no murmur, rub, or gallop    ABDOMEN:   Soft, non-tender, no rebound or gua bowel sounds active all four quadrants,rding   EXTREMITIES:  No ankle edema    SKIN: Dry to touch, no exanthems in the visualized areas   NEURO: Alert, Appears Cognitively, no significant movement of right upper extremity with decreased movement with right posterior ankle brace on right leg, cranial nerves II through XII grossly intact and speech is normal   PSYCH: Cooperative, behavior is appropriate      Medications:   Personally Reviewed.  Medications       alfuzosin ER  10 mg Oral Daily     aspirin  325 mg Oral Daily     atorvastatin  40 mg Oral At Bedtime     Baclofen  20 mg Oral TID     calcium carbonate-vitamin D  2 tablet Oral Daily     cefdinir  300 mg Oral Q12H PARISH     divalproex sodium extended-release  1,000 mg Oral QAM     divalproex sodium extended-release  1,500 mg Oral At Bedtime     gabapentin  300 mg Oral TID     lisinopril  30 mg Oral Daily     multivitamin w/minerals  1 tablet Oral Daily     pantoprazole  40 mg Oral QAM AC     sodium chloride (PF)  3 mL Intracatheter Q8H     sodium chloride (PF)  3 mL Intracatheter Q8H      vitamin C  500 mg Oral Daily     cholecalciferol  25 mcg Oral Daily       Data reviewed today: I personally reviewed all new medications, labs, imaging/diagnostics reports over the past 24 hours. Pertinent findings include:    Camilo Santoyo MD  [facility] Medicine  [facility]  Phone: #[phone]

## 2021-08-05 PROCEDURE — 99232 SBSQ HOSP IP/OBS MODERATE 35: CPT | Performed by: INTERNAL MEDICINE

## 2021-08-05 PROCEDURE — 250N000013 HC RX MED GY IP 250 OP 250 PS 637: Performed by: INTERNAL MEDICINE

## 2021-08-05 PROCEDURE — 120N000001 HC R&B MED SURG/OB

## 2021-08-05 PROCEDURE — 99207 PR CDG-MDM COMPONENT: MEETS MODERATE - DOWN CODED: CPT | Performed by: INTERNAL MEDICINE

## 2021-08-05 PROCEDURE — 250N000013 HC RX MED GY IP 250 OP 250 PS 637: Performed by: EMERGENCY MEDICINE

## 2021-08-05 RX ADMIN — ALFUZOSIN HYDROCHLORIDE 10 MG: 10 TABLET ORAL at 08:02

## 2021-08-05 RX ADMIN — PANTOPRAZOLE SODIUM 40 MG: 20 TABLET, DELAYED RELEASE ORAL at 06:30

## 2021-08-05 RX ADMIN — GABAPENTIN 300 MG: 300 CAPSULE ORAL at 08:03

## 2021-08-05 RX ADMIN — ACETAMINOPHEN AND CODEINE PHOSPHATE 1 HALF-TAB: 300; 30 TABLET ORAL at 21:04

## 2021-08-05 RX ADMIN — Medication 25 MCG: at 08:02

## 2021-08-05 RX ADMIN — OXYCODONE HYDROCHLORIDE AND ACETAMINOPHEN 500 MG: 500 TABLET ORAL at 08:02

## 2021-08-05 RX ADMIN — LISINOPRIL 30 MG: 20 TABLET ORAL at 08:02

## 2021-08-05 RX ADMIN — DIVALPROEX SODIUM 1000 MG: 500 TABLET, EXTENDED RELEASE ORAL at 08:03

## 2021-08-05 RX ADMIN — DIVALPROEX SODIUM 1500 MG: 500 TABLET, EXTENDED RELEASE ORAL at 21:04

## 2021-08-05 RX ADMIN — BACLOFEN 20 MG: 10 TABLET ORAL at 13:20

## 2021-08-05 RX ADMIN — GABAPENTIN 300 MG: 300 CAPSULE ORAL at 13:20

## 2021-08-05 RX ADMIN — Medication 2 TABLET: at 08:03

## 2021-08-05 RX ADMIN — BACLOFEN 20 MG: 10 TABLET ORAL at 08:02

## 2021-08-05 RX ADMIN — ATORVASTATIN CALCIUM 40 MG: 40 TABLET, FILM COATED ORAL at 21:04

## 2021-08-05 RX ADMIN — CEFDINIR 300 MG: 300 CAPSULE ORAL at 08:02

## 2021-08-05 RX ADMIN — BACLOFEN 20 MG: 10 TABLET ORAL at 21:04

## 2021-08-05 RX ADMIN — GABAPENTIN 300 MG: 300 CAPSULE ORAL at 21:04

## 2021-08-05 RX ADMIN — MULTIPLE VITAMINS W/ MINERALS TAB 1 TABLET: TAB at 08:02

## 2021-08-05 RX ADMIN — ASPIRIN 325 MG ORAL TABLET 325 MG: 325 PILL ORAL at 08:02

## 2021-08-05 ASSESSMENT — MIFFLIN-ST. JEOR: SCORE: 1668.49

## 2021-08-05 NOTE — PLAN OF CARE
Problem: Adult Inpatient Plan of Care  Goal: Absence of Hospital-Acquired Illness or Injury  Intervention: Identify and Manage Fall Risk  Intervention: Prevent Skin Injury  Intervention: Prevent and Manage VTE (Venous Thromboembolism) Risk    Goal: Optimal Comfort and Wellbeing  Outcome: Improving     Problem: UTI (Urinary Tract Infection)  Goal: Improved Infection Symptoms  Outcome: Improving     Problem: Pain Acute  Goal: Acceptable Pain Control and Functional Ability  Outcome: Improving  Intervention: Develop Pain Management Plan    Problem: Fall Injury Risk  Goal: Absence of Fall and Fall-Related Injury  Outcome: Improving  Intervention: Promote Injury-Free Environment       VSS. No chest pains or resp distress noted  A&O. Calls appropriately and able to make needs known to staff.  Difficulty on finding words but able to answer simple questions with yes or no. Kept on aspiration precautions. No noted coughing with drinking.  Safety alarms in use. Frequent rounding and orientation done within the shift.    Verbalized pain on right neck area. Utilized Prn tylenol w/ codeine with noted effective results noted. Pillow support to neck area.    Right sided weakness. X1 person with belt and quad cane.  Passive ROM to affected extremity.  Urinal within reach. Kept clean and dry by staff    Continues on oral Abx of Cefdinir  f/up US CT or MRI in 6 months  f/up hemoc  r/t small splenic infarct  Awaiting TCU placement

## 2021-08-05 NOTE — PROGRESS NOTES
[facility]    Medicine Progress Note - Hospitalist Service       Date of Admission:  7/29/2021    Assessment & Plan            66-year-old male [place]  with history of CVA with right-sided weakness, expressive aphasia.  Also with history of hypertension and depression who was admitted on July 29, 2021 for urinary tract infection, and acute encephalopathy.  Urine culture later grew fluoroquinolone resistant E. coli.  Patient has tolerated cefdinir.  He had incidental finding of complex right kidney cyst, mediastinal adenopathy and splenic infarct on imaging.  Urology is planning to follow with repeat ultrasound CT or MRI in 6 months.  Dr. Agarwal recommended hematology follow-up as outpatient for small splenic infarct.  We are awaiting TCU placement.  Eventually patient would request long-term care.  He seems to be getting closer to his baseline.    Urinary tract infection secondary to E. Coli  Urine culture with fluoroquinolone resistant E. coli.  6.2 cm complex right kidney cyst on CT  [place] [facility] follow-up as documented above.  S/p Abx.     Acute metabolic encephalopathy  Head CT negative  Seems to have resolved with treatment of UTI.     History of CVA  Right-sided residual weakness, expressive aphasia.  History of epilepsy.  Continue aspirin statin baclofen as ordered.     History of chronic marijuana use.  Essential hypertension  Pulmonary fibrosis  Mediastinal and hilar adenopathy on CT  Recommend repeat CT scan in 2 months.     Splenic infarct  Thrombocytopenia  Echocardiogram showed no evidence of acute thrombus.  Follow-up as outpatient with hematology.       Diet: Combination Diet Regular Diet Adult    DVT Prophylaxis: Pneumatic Compression Devices  Jiménez Catheter: Not present  Central Lines: None  Code Status: Full Code      Disposition Plan   Expected discharge: 08/05/2021 recommended to transitional care unit once safe disposition plan/ TCU bed available.      The patient's care was discussed with the Patient.    Lamont Kennedy MD  Hospitalist Service  [facility]  Securely message with the [employer] Web Console (learn more here)  Text page via [employer] Paging/Directory      Clinically Significant Risk Factors Present on Admission                ______________________________________________________________________    Interval History     No fevers/chills. No chest pain or SOB. Poor historian.    Data reviewed today: I reviewed all medications, new labs and imaging results over the last 24 hours. I personally reviewed no images or EKG's today.    Physical Exam   Vital Signs: Temp: 98.3  F (36.8  C) Temp src: Oral BP: 130/77 Pulse: 70   Resp: 16 SpO2: 94 % O2 Device: None (Room air)    Weight: 195 lbs 9.6 oz    Gen - alert, awake, oriented to self.  Lungs - CTA B.  Heart - RR,S1+S2 nml, no m/g/r.  Abd - soft, NT, ND, + BS.  Ext - no edema.    Data   Recent Labs   Lab 08/04/21  0636 08/03/21  0706 08/02/21  0555 07/31/21  0753 07/30/21  0928 07/29/21  1835   WBC  --   --   --  5.2 4.7 5.2   HGB  --   --   --  11.7* 11.6* 11.8*   MCV  --   --   --  101* 100 98   PLT  --   --  179 138* 135* 145*   NA  --   --   --  138 137 136   POTASSIUM 4.5 4.2 3.8 4.0 3.9  3.9 3.9   CHLORIDE  --   --   --  109* 105 103   CO2  --   --   --  23 23 23   BUN  --   --   --  15 17 21   CR  --   --  0.72 0.75 0.70 0.76   ANIONGAP  --   --   --  6 9 10   ELMA  --   --   --  8.1* 8.4* 8.8   GLC  --   --   --  80 74 87   ALBUMIN  --   --   --   --  2.4* 2.7*   PROTTOTAL  --   --   --   --  6.0 6.6   BILITOTAL  --   --   --   --  0.4 0.5   ALKPHOS  --   --   --   --  51 57   ALT  --   --   --   --  18 14   AST  --   --   --   --  26 22     No results found for this or any previous visit (from the past 24 hour(s)).  Medications       alfuzosin ER  10 mg Oral Daily     aspirin  325 mg Oral Daily     atorvastatin  40 mg Oral At Bedtime     Baclofen  20 mg Oral TID     calcium  carbonate-vitamin D  2 tablet Oral Daily     divalproex sodium extended-release  1,000 mg Oral QAM     divalproex sodium extended-release  1,500 mg Oral At Bedtime     gabapentin  300 mg Oral TID     lisinopril  30 mg Oral Daily     multivitamin w/minerals  1 tablet Oral Daily     pantoprazole  40 mg Oral QAM AC     sodium chloride (PF)  3 mL Intracatheter Q8H     sodium chloride (PF)  3 mL Intracatheter Q8H     vitamin C  500 mg Oral Daily     cholecalciferol  25 mcg Oral Daily

## 2021-08-05 NOTE — PLAN OF CARE
Problem: UTI (Urinary Tract Infection)  Goal: Improved Infection Symptoms  Outcome: Improving     Problem: Pain Acute  Goal: Acceptable Pain Control and Functional Ability  Outcome: Improving  Intervention: Develop Pain Management Plan  Recent Flowsheet Documentation  Taken 8/4/2021 1637 by Aleena Moss RN  Pain Management Interventions: medication (see MAR)     Problem: Pain Acute  Goal: Acceptable Pain Control and Functional Ability  Outcome: Improving  Intervention: Develop Pain Management Plan  Recent Flowsheet Documentation  Taken 8/4/2021 1637 by Aleena Moss RN  Pain Management Interventions: medication (see MAR)   Patient vitals stable throughout shift. Patient complained of a headache. RN administered 650 mg of Tylenol. Patient was sleeping upon reassessment.

## 2021-08-05 NOTE — PLAN OF CARE
Problem: UTI (Urinary Tract Infection)  Goal: Improved Infection Symptoms  Outcome: Improving  Voiding well, completed antibiotic regimen. Uses urinal at bedside.    Problem: Pain Acute  Goal: Acceptable Pain Control and Functional Ability  Outcome: Improving  Ambulates with walker and assist of 1. On bed and chair alarms for optimal safety however has been calling appropriately. Up in chair for most of shift. Tolerates regular diet. Able to make needs known.    Problem: Adult Inpatient Plan of Care  Goal: Readiness for Transition of Care  Outcome: Improving  Will discharge to TCU, placement pending.    Charlette Watson RN

## 2021-08-05 NOTE — PROGRESS NOTES
Care Management Follow Up    Length of Stay (days): 7    Expected Discharge Date: 08/06/2021     Concerns to be Addressed:       Patient plan of care discussed at interdisciplinary rounds: Yes    Anticipated Discharge Disposition: TCU       Anticipated Discharge Services:  TCU  Anticipated Discharge DME:  TBD     Patient/family educated on Medicare website which has current facility and service quality ratings:  Yes   Education Provided on the Discharge Plan:  Yes   Patient/Family in Agreement with the Plan:  Yes     Referrals Placed by CM/SW:  TCU  Private pay costs discussed: N/A    Additional Information:  RICARDO met and introduced self and CM services to Pt. Pt understands the recommendation for TCU and asked Pt if OK to expand the search area.  More TCU referrals sent,  SWCM left VM for [facility], [facility], and [facility].     2:05 PM  [facility] talked with sonGoldy and ok to expand TCU search. Referrals sent.   [facility] and [facility] are full. [facility] not taking admissions until Monday.      3:14 PM  [facility] received call from Savana with [facility] and may have a bed for Pt tomorrow pending the discharges.  PAS needed.       KRISH Scanlon

## 2021-08-06 ENCOUNTER — APPOINTMENT (OUTPATIENT)
Dept: PHYSICAL THERAPY | Facility: CLINIC | Age: 66
DRG: 689 | End: 2021-08-06
Payer: MEDICARE

## 2021-08-06 ENCOUNTER — APPOINTMENT (OUTPATIENT)
Dept: OCCUPATIONAL THERAPY | Facility: CLINIC | Age: 66
DRG: 689 | End: 2021-08-06
Payer: MEDICARE

## 2021-08-06 ENCOUNTER — DOCUMENTATION ONLY (OUTPATIENT)
Dept: OTHER | Facility: CLINIC | Age: 66
End: 2021-08-06

## 2021-08-06 VITALS
HEIGHT: 70 IN | HEART RATE: 80 BPM | RESPIRATION RATE: 16 BRPM | OXYGEN SATURATION: 94 % | SYSTOLIC BLOOD PRESSURE: 93 MMHG | WEIGHT: 197.6 LBS | BODY MASS INDEX: 28.29 KG/M2 | TEMPERATURE: 97.5 F | DIASTOLIC BLOOD PRESSURE: 60 MMHG

## 2021-08-06 LAB — SARS-COV-2 RNA RESP QL NAA+PROBE: NEGATIVE

## 2021-08-06 PROCEDURE — 250N000013 HC RX MED GY IP 250 OP 250 PS 637: Performed by: INTERNAL MEDICINE

## 2021-08-06 PROCEDURE — 250N000013 HC RX MED GY IP 250 OP 250 PS 637: Performed by: EMERGENCY MEDICINE

## 2021-08-06 PROCEDURE — 87635 SARS-COV-2 COVID-19 AMP PRB: CPT | Performed by: INTERNAL MEDICINE

## 2021-08-06 PROCEDURE — 97535 SELF CARE MNGMENT TRAINING: CPT | Mod: GO

## 2021-08-06 PROCEDURE — 99232 SBSQ HOSP IP/OBS MODERATE 35: CPT | Performed by: INTERNAL MEDICINE

## 2021-08-06 PROCEDURE — 97116 GAIT TRAINING THERAPY: CPT | Mod: GP

## 2021-08-06 RX ORDER — GABAPENTIN 300 MG/1
300 CAPSULE ORAL 3 TIMES DAILY
Status: DISCONTINUED | OUTPATIENT
Start: 2021-08-06 | End: 2021-08-06

## 2021-08-06 RX ORDER — BACLOFEN 10 MG/1
20 TABLET ORAL 3 TIMES DAILY
Status: DISCONTINUED | OUTPATIENT
Start: 2021-08-06 | End: 2021-08-06

## 2021-08-06 RX ORDER — BACLOFEN 20 MG/1
20 TABLET ORAL 3 TIMES DAILY
Refills: 0
Start: 2021-08-06

## 2021-08-06 RX ADMIN — BACLOFEN 20 MG: 10 TABLET ORAL at 14:53

## 2021-08-06 RX ADMIN — OXYCODONE HYDROCHLORIDE AND ACETAMINOPHEN 500 MG: 500 TABLET ORAL at 08:30

## 2021-08-06 RX ADMIN — DIVALPROEX SODIUM 1000 MG: 500 TABLET, EXTENDED RELEASE ORAL at 08:30

## 2021-08-06 RX ADMIN — Medication 2 TABLET: at 08:30

## 2021-08-06 RX ADMIN — MULTIPLE VITAMINS W/ MINERALS TAB 1 TABLET: TAB at 08:30

## 2021-08-06 RX ADMIN — LISINOPRIL 30 MG: 20 TABLET ORAL at 08:30

## 2021-08-06 RX ADMIN — GABAPENTIN 300 MG: 300 CAPSULE ORAL at 14:53

## 2021-08-06 RX ADMIN — ACETAMINOPHEN 650 MG: 325 TABLET ORAL at 10:21

## 2021-08-06 RX ADMIN — ACETAMINOPHEN AND CODEINE PHOSPHATE 1 HALF-TAB: 300; 30 TABLET ORAL at 08:30

## 2021-08-06 RX ADMIN — GABAPENTIN 300 MG: 300 CAPSULE ORAL at 08:30

## 2021-08-06 RX ADMIN — DICLOFENAC SODIUM 4 G: 10 GEL TOPICAL at 12:22

## 2021-08-06 RX ADMIN — Medication 25 MCG: at 08:30

## 2021-08-06 RX ADMIN — ACETAMINOPHEN AND CODEINE PHOSPHATE 1 HALF-TAB: 300; 30 TABLET ORAL at 16:40

## 2021-08-06 RX ADMIN — BACLOFEN 20 MG: 10 TABLET ORAL at 08:34

## 2021-08-06 RX ADMIN — ASPIRIN 325 MG ORAL TABLET 325 MG: 325 PILL ORAL at 08:30

## 2021-08-06 RX ADMIN — ALFUZOSIN HYDROCHLORIDE 10 MG: 10 TABLET ORAL at 08:29

## 2021-08-06 RX ADMIN — PANTOPRAZOLE SODIUM 40 MG: 20 TABLET, DELAYED RELEASE ORAL at 06:23

## 2021-08-06 ASSESSMENT — MIFFLIN-ST. JEOR: SCORE: 1677.56

## 2021-08-06 NOTE — DISCHARGE SUMMARY
[facility] MEDICINE  DISCHARGE SUMMARY     Primary Care Physician: No primary care provider on file.  Admission Date: 7/29/2021   Discharge Provider: Liza Hooks MD Discharge Date: 8/6/2021   Diet:   Active Diet and Nourishment Order   Procedures     Combination Diet Regular Diet Adult     Advance Diet as Tolerated       Code Status: Full Code   Activity: DCACTIVITY: Activity as tolerated        Condition at Discharge: Stable     REASON FOR PRESENTATION(See Admission Note for Details)   Confusion     PRINCIPAL & ACTIVE DISCHARGE DIAGNOSES     UTI 2/2 E. Coli  Acute metabolic encephalopathy    Active Problems:    Pneumonitis    Urinary tract infection without hematuria, site unspecified      PENDING LABS     Unresulted Labs Ordered in the Past 30 Days of this Admission     No orders found from 6/29/2021 to 7/30/2021.            PROCEDURES ( this hospitalization only)          RECOMMENDATIONS TO OUTPATIENT PROVIDER FOR F/U VISIT     Follow-up Appointments     Follow Up and recommended labs and tests      Follow up with Nursing home physician.  No follow up labs or test are   needed.               DISPOSITION     Skilled Nursing Facility    SUMMARY OF HOSPITAL COURSE:       66-year-old male [place]  with history of CVA with right-sided weakness, expressive aphasia, hypertension and depression who was admitted on July 29, 2021 for urinary tract infection, and acute encephalopathy.  Urine culture later grew fluoroquinolone resistant E. coli.  Patient has tolerated cefdinir.  He had incidental finding of complex right kidney cyst, mediastinal adenopathy and splenic infarct on imaging.  Urology is planning to follow with repeat ultrasound CT or MRI in 6 months. He needs hematology follow-up as outpatient for small splenic infarct. He was transferred to TCU     Discharge Medications with Med changes:     Current Discharge Medication List      CONTINUE these medications  which have CHANGED    Details   baclofen (LIORESAL) 20 MG tablet Take 1 tablet (20 mg) by mouth 3 times daily  Refills: 0    Associated Diagnoses: Cerebrovascular accident (CVA), unspecified mechanism (H)         CONTINUE these medications which have NOT CHANGED    Details   acetaminophen (TYLENOL) 325 MG tablet Take 650 mg by mouth every 6 hours as needed      alendronate (FOSAMAX) 70 MG tablet Take 70 mg by mouth once a week Sunday mornings, first thing with water only. Do not eat or drink for 30 min and remain upright for 30 min      alfuzosin ER (UROXATRAL) 10 MG 24 hr tablet Take 10 mg by mouth daily      aspirin (ASA) 325 MG tablet Take 325 mg by mouth daily      atorvastatin (LIPITOR) 80 MG tablet Take 40 mg by mouth At Bedtime       calcium citrate-vitamin D (CITRACAL) 315-200 MG-UNIT TABS per tablet Take 2 tablets by mouth daily       cholecalciferol 25 MCG (1000 UT) TABS Take 50 mcg by mouth daily       diclofenac (VOLTAREN) 1 % topical gel Apply 4 g topically 4 times daily as needed To back and right leg      !! divalproex sodium extended-release (DEPAKOTE ER) 500 MG 24 hr tablet Take 1,000 mg by mouth every morning      !! divalproex sodium extended-release (DEPAKOTE ER) 500 MG 24 hr tablet Take 1,500 mg by mouth At Bedtime      gabapentin (NEURONTIN) 300 MG capsule Take 300 mg by mouth 3 times daily      lisinopril (ZESTRIL) 40 MG tablet Take 30 mg by mouth daily       multivitamin (DEKAS ESSENTIAL) capsule Take 1 capsule by mouth daily      omeprazole (PRILOSEC) 20 MG DR capsule Take 20 mg by mouth every morning 30 min prior to meal      senna-docusate (SENOKOT-S/PERICOLACE) 8.6-50 MG tablet Take 2 tablets by mouth 2 times daily as needed      vitamin C (ASCORBIC ACID) 500 MG tablet Take 500 mg by mouth daily       !! - Potential duplicate medications found. Please discuss with provider.      STOP taking these medications       ibuprofen (ADVIL/MOTRIN) 400 MG tablet Comments:   Reason for Stopping:                  Consults       PHYSICAL THERAPY ADULT IP CONSULT  OCCUPATIONAL THERAPY ADULT IP CONSULT  UROLOGY IP CONSULT  PHYSICAL THERAPY ADULT IP CONSULT  OCCUPATIONAL THERAPY ADULT IP CONSULT    Immunizations given this encounter       There is no immunization history on file for this patient.         SIGNIFICANT IMAGING FINDINGS     Results for orders placed or performed during the hospital encounter of 07/29/21   Foot  XR, G/E 3 views, right    Impression    IMPRESSION:   Moderate hallux valgus. Diffuse osteopenia. No evidence of acute fracture. Mild first MTP joint degenerative changes.   CT Chest Pulmonary Embolism w Contrast    Impression    IMPRESSION:  1.  Negative for pulmonary embolism. Enlargement of the central pulmonary arteries suggest pulmonary arterial hypertension.    2.  Interstitial fibrotic change in the periphery of the lungs.    3.  Mild patchy scattered groundglass opacities in the periphery of the lungs are nonspecific and could be seen with a mild or low-grade pneumonitis. Clinical correlation.    4.  Mild bilateral hilar and mediastinal adenopathy. This may be reactive in nature but follow-up CT in 3 months is recommended for reevaluation to exclude any progressive process.    5.  Marked coronary artery calcification.   CT Abdomen Pelvis w Contrast    Impression    IMPRESSION:   1.  Focal area of peripheral decreased enhancement in the spleen medially is concerning for a small acute splenic infarct.  2.  Partially exophytic complex cyst in the right kidney lower pole measuring 6.2 cm with multiple septations. A cystic renal cell neoplasm cannot be excluded. An MRI of the kidneys without and with IV gadolinium is recommended in the near future.  3.  Multiple low-attenuation lesions in the liver. These may represent benign cysts and can be reassessed at the time of the renal MRI.  4.  Moderate changes of pulmonary fibrosis in both lung bases.  5.  Diffuse atheromatous plaque in the abdominal  aorta.   Head CT w/o contrast    Impression    IMPRESSION:  1.  No acute intracranial process.   US Lower Extremity Venous Duplex Right    Impression    IMPRESSION:  1.  No deep venous thrombosis in the right lower extremity.   MR Renal wo & w Contrast    Impression    IMPRESSION:    1. Multiple bilateral renal cysts including a mildly complex 5.2 cm Bosniak category 2F right renal cyst with multiple thin internal nonenhancing septations. Recommend 6 month ultrasound, CT or MR follow-up.  2. Multiple bilateral simple renal cysts. No follow-up of these simple cysts is needed.  3. Multiple incidental simple hepatic cysts.   Echocardiogram Complete   Result Value Ref Range    LVEF  60-65%          Discharge Orders        CT Chest w Contrast     Adult Urology Referral      Oncology/Hematology Adult Referral      General info for SNF    Length of Stay Estimate: Long Term Care  Condition at Discharge: Stable  Level of care:skilled   Rehabilitation Potential: Good  Admission H&P remains valid and up-to-date: Yes  Recent Chemotherapy: N/A  Use Nursing Home Standing Orders: Yes     Mantoux instructions    Give two-step Mantoux (PPD) Per Facility Policy Yes     Reason for your hospital stay    Acute encephalopathy due to UTI     Follow Up and recommended labs and tests    Follow up with Nursing home physician.  No follow up labs or test are needed.     Activity - Up ad dixie     Physical Therapy Adult Consult    Evaluate and treat as clinically indicated.     Occupational Therapy Adult Consult    Evaluate and treat as clinically indicated.     Advance Diet as Tolerated    Follow this diet upon discharge: Orders Placed This Encounter      Combination Diet Regular Diet Adult       Examination   Physical Exam   Temp:  [97.5  F (36.4  C)-97.8  F (36.6  C)] 97.5  F (36.4  C)  Pulse:  [72-80] 80  Resp:  [16-18] 16  BP: ()/(60-73) 93/60  SpO2:  [91 %-94 %] 94 %  Wt Readings from Last 1 Encounters:   08/06/21 89.6 kg (197 lb 9.6  oz)     General Appearance:  Alert, cooperative, no distress   Head:  Normocephalic, atraumatic   Eyes:  PERRL    Throat:  mucosa; moist   Neck: No JVD, no LAP   Lungs:   Clear to auscultation bilaterally, respirations unlabored   Heart:  Regular rate and rhythm, S1, S2   Abdomen:   Soft, non tender, non distended, bowel sounds present, no guarding or rigidity   Extremities: No edema   Skin: Skin color, texture, turgor normal, no rashes or lesions   Neurologic: Alert and oriented X 3           Please see EMR for more detailed significant labs, imaging, consultant notes etc.    ILiza MD, personally saw the patient today and spent greater than 30 minutes discharging this patient.    Liza Hooks MD  [facility]    CC:No primary care provider on file.

## 2021-08-06 NOTE — PROGRESS NOTES
[facility] MEDICINE PROGRESS NOTE      Identification/Summary:      66-year-old male [place]  with history of CVA with right-sided weakness, expressive aphasia, hypertension and depression who was admitted on July 29, 2021 for urinary tract infection, and acute encephalopathy.  Urine culture later grew fluoroquinolone resistant E. coli.  Patient has tolerated cefdinir.  He had incidental finding of complex right kidney cyst, mediastinal adenopathy and splenic infarct on imaging.  Urology is planning to follow with repeat ultrasound CT or MRI in 6 months. He needs hematology follow-up as outpatient for small splenic infarct.  We are awaiting TCU placement.  Eventually patient would request long-term care.        Assessment and Plan:    UTI 2/2 E. Coli  UC with fluoroquinolone resistant E. coli.  6.2 cm complex right kidney cyst on CT  [place] [facility] follow-up as documented above.  Completed treatment course      Acute metabolic encephalopathy  Head CT negative  Seems to have resolved with treatment of UTI.     History of CVA  Right-sided residual weakness, expressive aphasia.  History of epilepsy.  Continue aspirin statin baclofen as ordered.     History of chronic marijuana use.  Essential hypertension  Pulmonary fibrosis  Mediastinal and hilar adenopathy on CT  Recommend repeat CT scan in 2 months.     Splenic infarct  Thrombocytopenia  Echocardiogram showed no evidence of acute thrombus.  Follow-up as outpatient with hematology.       Diet: Combination Diet Regular Diet Adult  DVT Prophylaxis: he is on aspirin 325 mg daily, SCD  Code Status: Full Code    Awaiting TCU placement     Interval History/Subjective:  Up in the chair, feeling well, denies any complaints     Physical Exam/Objective:  Temp:  [97.7  F (36.5  C)-98.3  F (36.8  C)] 97.7  F (36.5  C)  Pulse:  [72-79] 72  Resp:  [18-20] 18  BP: (110-122)/(72-73) 110/73  SpO2:  [91 %-95 %] 94 %    Body mass index is  28.35 kg/m .    Physical Exam:    General Appearance:  Alert, cooperative, no distress   Head:  Normocephalic, atraumatic   Eyes:  PERRL    Throat:  mucosa; moist   Neck: No JVD, no LAP   Lungs:   Clear to auscultation bilaterally, respirations unlabored   Heart:  Regular rate and rhythm, S1, S2   Abdomen:   Soft, non tender, non distended, bowel sounds present, no guarding or rigidity   Extremities: No edema   Skin: Skin color, texture, turgor normal, no rashes or lesions   Neurologic: Alert and oriented X 3         Medications:   Personally Reviewed.  Medications       alfuzosin ER  10 mg Oral Daily     aspirin  325 mg Oral Daily     atorvastatin  40 mg Oral At Bedtime     Baclofen  20 mg Oral TID     calcium carbonate-vitamin D  2 tablet Oral Daily     divalproex sodium extended-release  1,000 mg Oral QAM     divalproex sodium extended-release  1,500 mg Oral At Bedtime     gabapentin  300 mg Oral TID     lisinopril  30 mg Oral Daily     multivitamin w/minerals  1 tablet Oral Daily     pantoprazole  40 mg Oral QAM AC     sodium chloride (PF)  3 mL Intracatheter Q8H     sodium chloride (PF)  3 mL Intracatheter Q8H     vitamin C  500 mg Oral Daily     cholecalciferol  25 mcg Oral Daily       Data reviewed today: I personally reviewed all new medications, labs, imaging/diagnostics reports over the past 24 hours.     Imaging:   No results found for this or any previous visit (from the past 24 hour(s)).    Labs:  Most Recent 3 CBC's:Recent Labs   Lab Test 08/02/21  0555 07/31/21  0753 07/30/21  0928 07/29/21  1835   WBC  --  5.2 4.7 5.2   HGB  --  11.7* 11.6* 11.8*   MCV  --  101* 100 98    138* 135* 145*     Most Recent 3 BMP's:Recent Labs   Lab Test 08/04/21  0636 08/03/21  0706 08/02/21  0555 07/31/21  0753 07/30/21  0928 07/29/21  1835   NA  --   --   --  138 137 136   POTASSIUM 4.5 4.2 3.8 4.0 3.9  3.9 3.9   CHLORIDE  --   --   --  109* 105 103   CO2  --   --   --  23 23 23   BUN  --   --   --  15 17 21    CR  --   --  0.72 0.75 0.70 0.76   ANIONGAP  --   --   --  6 9 10   ELMA  --   --   --  8.1* 8.4* 8.8   GLC  --   --   --  80 74        Liza Hooks MD  [facility]  Phone: #[phone]

## 2021-08-06 NOTE — PLAN OF CARE
Problem: Adult Inpatient Plan of Care  Goal: Plan of Care Review  Outcome: Improving  Goal: Patient-Specific Goal (Individualized)  Outcome: Improving  Goal: Absence of Hospital-Acquired Illness or Injury  Outcome: Improving  Intervention: Identify and Manage Fall Risk  Recent Flowsheet Documentation  Taken 8/6/2021 0014 by Camryn Song RN  Safety Promotion/Fall Prevention:    chair alarm on    bed alarm on    increase visualization of patient    lighting adjusted    mobility aid in reach    nonskid shoes/slippers when out of bed    patient and family education    room door open    room near nurse's station    safety round/check completed    fall prevention program maintained    clutter free environment maintained  Taken 8/5/2021 2104 by Camryn Song RN  Safety Promotion/Fall Prevention:    chair alarm on    bed alarm on    increase visualization of patient    lighting adjusted    mobility aid in reach    nonskid shoes/slippers when out of bed    patient and family education    room door open    room near nurse's station    safety round/check completed    fall prevention program maintained    clutter free environment maintained  Intervention: Prevent Skin Injury  Recent Flowsheet Documentation  Taken 8/6/2021 0014 by Camryn Song RN  Body Position: turned  Taken 8/5/2021 2104 by Camryn Song RN  Body Position: weight shifting  Intervention: Prevent and Manage VTE (Venous Thromboembolism) Risk  Recent Flowsheet Documentation  Taken 8/6/2021 0014 by Camryn Song RN  VTE Prevention/Management:    anticoagulant therapy maintained    PROM (passive range of motion) performed (right side)    dorsiflexion/plantar flexion performed  Taken 8/5/2021 2104 by Camryn Song RN  VTE Prevention/Management:    anticoagulant therapy maintained    PROM (passive range of motion) performed    dorsiflexion/plantar flexion performed  Goal: Optimal Comfort and Wellbeing  Outcome: Improving    Neck  pain at 2100. Acetaminophen-codeine PRN given with noted effective results until this time. Able to position self in bed per comfort with assist    Goal: Readiness for Transition of Care  Outcome: Improving     Anticipating to be discharged to TCU. [place]    Problem: UTI (Urinary Tract Infection)  Goal: Improved Infection Symptoms  Outcome: Improving     No urgency or hesitancy. Voiding adequate amounts of clear urine.  Completed Abx course    Problem: Pain Acute  Goal: Acceptable Pain Control and Functional Ability  Outcome: Improving  Intervention: Develop Pain Management Plan  Recent Flowsheet Documentation  Taken 8/5/2021 2104 by Camryn Song, RN  Pain Management Interventions: medication (see MAR)     Problem: Fall Injury Risk  Goal: Absence of Fall and Fall-Related Injury  Outcome: Improving  Intervention: Promote Injury-Free Environment  Recent Flowsheet Documentation  Taken 8/6/2021 0014 by Camryn Song, RN  Safety Promotion/Fall Prevention:    chair alarm on    bed alarm on    increase visualization of patient    lighting adjusted    mobility aid in reach    nonskid shoes/slippers when out of bed    patient and family education    room door open    room near nurse's station    safety round/check completed    fall prevention program maintained    clutter free environment maintained  Taken 8/5/2021 2104 by Camryn Song, RN  Safety Promotion/Fall Prevention:    chair alarm on    bed alarm on    increase visualization of patient    lighting adjusted    mobility aid in reach    nonskid shoes/slippers when out of bed    patient and family education    room door open    room near nurse's station    safety round/check completed    fall prevention program maintained    clutter free environment maintained     Frequent orientation and rounding done. Able to make needs known to staff  Call light within reach. X 1 person assist with cane. No dizziness or lightheadedness.

## 2021-08-07 NOTE — PLAN OF CARE
Occupational Therapy Discharge Summary    Reason for therapy discharge:    Discharged to transitional care facility.    Progress towards therapy goal(s). See goals on Care Plan in [facility] electronic health record for goal details.  Goals partially met.  Barriers to achieving goals:   discharge from facility.    Therapy recommendation(s):    Continued therapy is recommended at TCU to increase ADL independence.      Ludivina Espinosa OTRL

## 2021-08-07 NOTE — PLAN OF CARE
Physical Therapy Discharge Summary    Reason for therapy discharge:    Discharged to transitional care facility.    Progress towards therapy goal(s). See goals on Care Plan in [facility] electronic health record for goal details.  Goals not met.  Barriers to achieving goals:   limited tolerance for therapy.    Therapy recommendation(s):    Continued therapy is recommended.  Rationale/Recommendations:  continued rehabilitation at TCU.

## 2021-08-17 ENCOUNTER — DOCUMENTATION ONLY (OUTPATIENT)
Dept: OTHER | Facility: CLINIC | Age: 66
End: 2021-08-17

## 2021-10-01 ENCOUNTER — HOSPITAL ENCOUNTER (OUTPATIENT)
Dept: CT IMAGING | Facility: HOSPITAL | Age: 66
Discharge: HOME | End: 2021-10-01
Attending: INTERNAL MEDICINE | Admitting: INTERNAL MEDICINE
Payer: MEDICARE

## 2021-10-01 DIAGNOSIS — R59.0 MEDIASTINAL LYMPHADENOPATHY: ICD-10-CM

## 2021-10-01 LAB
CREAT BLD-MCNC: 1.1 MG/DL (ref 0.7–1.3)
GFR SERPL CREATININE-BSD FRML MDRD: >60 ML/MIN/1.73M2

## 2021-10-01 PROCEDURE — 250N000011 HC RX IP 250 OP 636: Performed by: INTERNAL MEDICINE

## 2021-10-01 PROCEDURE — 71260 CT THORAX DX C+: CPT

## 2021-10-01 PROCEDURE — 82565 ASSAY OF CREATININE: CPT

## 2021-10-01 RX ORDER — IOPAMIDOL 755 MG/ML
100 INJECTION, SOLUTION INTRAVASCULAR ONCE
Status: COMPLETED | OUTPATIENT
Start: 2021-10-01 | End: 2021-10-01

## 2021-10-01 RX ADMIN — IOPAMIDOL 100 ML: 755 INJECTION, SOLUTION INTRAVENOUS at 13:23

## 2021-10-12 ENCOUNTER — APPOINTMENT (OUTPATIENT)
Dept: RADIOLOGY | Facility: CLINIC | Age: 66
End: 2021-10-12
Attending: EMERGENCY MEDICINE
Payer: MEDICARE

## 2021-10-12 ENCOUNTER — HOSPITAL ENCOUNTER (EMERGENCY)
Facility: CLINIC | Age: 66
Discharge: NURSING, REHAB OR LONG-TERM CARE | End: 2021-10-12
Attending: EMERGENCY MEDICINE | Admitting: EMERGENCY MEDICINE
Payer: MEDICARE

## 2021-10-12 VITALS
OXYGEN SATURATION: 95 % | WEIGHT: 203 LBS | BODY MASS INDEX: 29.13 KG/M2 | RESPIRATION RATE: 6 BRPM | DIASTOLIC BLOOD PRESSURE: 87 MMHG | HEART RATE: 61 BPM | SYSTOLIC BLOOD PRESSURE: 150 MMHG | TEMPERATURE: 98.4 F

## 2021-10-12 DIAGNOSIS — R06.02 SHORTNESS OF BREATH: ICD-10-CM

## 2021-10-12 DIAGNOSIS — M25.551 HIP PAIN, CHRONIC, RIGHT: ICD-10-CM

## 2021-10-12 DIAGNOSIS — G89.29 HIP PAIN, CHRONIC, RIGHT: ICD-10-CM

## 2021-10-12 LAB
ALBUMIN SERPL-MCNC: 3 G/DL (ref 3.5–5)
ALP SERPL-CCNC: 58 U/L (ref 45–120)
ALT SERPL W P-5'-P-CCNC: <9 U/L (ref 0–45)
ANION GAP SERPL CALCULATED.3IONS-SCNC: 9 MMOL/L (ref 5–18)
APTT PPP: 32 SECONDS (ref 22–38)
AST SERPL W P-5'-P-CCNC: 17 U/L (ref 0–40)
ATRIAL RATE - MUSE: 84 BPM
BILIRUB DIRECT SERPL-MCNC: <0.1 MG/DL
BILIRUB SERPL-MCNC: 0.2 MG/DL (ref 0–1)
BNP SERPL-MCNC: 103 PG/ML (ref 0–62)
BUN SERPL-MCNC: 24 MG/DL (ref 8–22)
CALCIUM SERPL-MCNC: 10.2 MG/DL (ref 8.5–10.5)
CHLORIDE BLD-SCNC: 98 MMOL/L (ref 98–107)
CO2 SERPL-SCNC: 25 MMOL/L (ref 22–31)
CREAT SERPL-MCNC: 1.08 MG/DL (ref 0.7–1.3)
DIASTOLIC BLOOD PRESSURE - MUSE: 84 MMHG
ERYTHROCYTE [DISTWIDTH] IN BLOOD BY AUTOMATED COUNT: 13.6 % (ref 10–15)
GFR SERPL CREATININE-BSD FRML MDRD: 71 ML/MIN/1.73M2
GLUCOSE BLD-MCNC: 105 MG/DL (ref 70–125)
HCT VFR BLD AUTO: 40.9 % (ref 40–53)
HGB BLD-MCNC: 13.8 G/DL (ref 13.3–17.7)
INR PPP: 1.06 (ref 0.85–1.15)
INTERPRETATION ECG - MUSE: NORMAL
MCH RBC QN AUTO: 32.6 PG (ref 26.5–33)
MCHC RBC AUTO-ENTMCNC: 33.7 G/DL (ref 31.5–36.5)
MCV RBC AUTO: 97 FL (ref 78–100)
P AXIS - MUSE: 25 DEGREES
PLATELET # BLD AUTO: 336 10E3/UL (ref 150–450)
POTASSIUM BLD-SCNC: 4.9 MMOL/L (ref 3.5–5)
PR INTERVAL - MUSE: 164 MS
PROT SERPL-MCNC: 7.6 G/DL (ref 6–8)
QRS DURATION - MUSE: 86 MS
QT - MUSE: 382 MS
QTC - MUSE: 451 MS
R AXIS - MUSE: 2 DEGREES
RBC # BLD AUTO: 4.23 10E6/UL (ref 4.4–5.9)
SODIUM SERPL-SCNC: 132 MMOL/L (ref 136–145)
SYSTOLIC BLOOD PRESSURE - MUSE: 141 MMHG
T AXIS - MUSE: -7 DEGREES
TROPONIN I SERPL-MCNC: 0.02 NG/ML (ref 0–0.29)
TROPONIN I SERPL-MCNC: <0.01 NG/ML (ref 0–0.29)
VALPROATE SERPL-MCNC: 90.2 UG/ML
VENTRICULAR RATE- MUSE: 84 BPM
WBC # BLD AUTO: 7 10E3/UL (ref 4–11)

## 2021-10-12 PROCEDURE — 93005 ELECTROCARDIOGRAM TRACING: CPT

## 2021-10-12 PROCEDURE — 71046 X-RAY EXAM CHEST 2 VIEWS: CPT

## 2021-10-12 PROCEDURE — 36415 COLL VENOUS BLD VENIPUNCTURE: CPT | Performed by: EMERGENCY MEDICINE

## 2021-10-12 PROCEDURE — 80053 COMPREHEN METABOLIC PANEL: CPT | Performed by: EMERGENCY MEDICINE

## 2021-10-12 PROCEDURE — 80164 ASSAY DIPROPYLACETIC ACD TOT: CPT | Performed by: EMERGENCY MEDICINE

## 2021-10-12 PROCEDURE — 93005 ELECTROCARDIOGRAM TRACING: CPT | Performed by: EMERGENCY MEDICINE

## 2021-10-12 PROCEDURE — 85014 HEMATOCRIT: CPT | Performed by: EMERGENCY MEDICINE

## 2021-10-12 PROCEDURE — 93005 ELECTROCARDIOGRAM TRACING: CPT | Mod: 76

## 2021-10-12 PROCEDURE — 83880 ASSAY OF NATRIURETIC PEPTIDE: CPT | Performed by: EMERGENCY MEDICINE

## 2021-10-12 PROCEDURE — 99285 EMERGENCY DEPT VISIT HI MDM: CPT | Mod: 25

## 2021-10-12 PROCEDURE — 84484 ASSAY OF TROPONIN QUANT: CPT | Performed by: EMERGENCY MEDICINE

## 2021-10-12 PROCEDURE — 250N000013 HC RX MED GY IP 250 OP 250 PS 637: Performed by: EMERGENCY MEDICINE

## 2021-10-12 PROCEDURE — 82248 BILIRUBIN DIRECT: CPT | Performed by: EMERGENCY MEDICINE

## 2021-10-12 PROCEDURE — 85610 PROTHROMBIN TIME: CPT | Performed by: EMERGENCY MEDICINE

## 2021-10-12 PROCEDURE — 85730 THROMBOPLASTIN TIME PARTIAL: CPT | Performed by: EMERGENCY MEDICINE

## 2021-10-12 RX ORDER — HYDROCODONE BITARTRATE AND ACETAMINOPHEN 5; 325 MG/1; MG/1
1 TABLET ORAL EVERY 8 HOURS PRN
Qty: 3 TABLET | Refills: 0 | Status: SHIPPED | OUTPATIENT
Start: 2021-10-12

## 2021-10-12 RX ORDER — ASPIRIN 81 MG/1
162 TABLET, CHEWABLE ORAL ONCE
Status: COMPLETED | OUTPATIENT
Start: 2021-10-12 | End: 2021-10-12

## 2021-10-12 RX ADMIN — ASPIRIN 162 MG: 81 TABLET, CHEWABLE ORAL at 19:19

## 2021-10-12 NOTE — ED TRIAGE NOTES
Staff stated that patient was complaining of chest pain and that his heart rate was in the 40. He also kept saying that he needed 02. When EMS arrived vital signs we normal 138 80, 02 % RA. They still requested for patient to be transported. Patient is alert to self. Recent CVA, right sided weakness.

## 2021-10-12 NOTE — ED PROVIDER NOTES
"67 yo M, history of recent stroke with baseline aphasia (\"yes\" / \"no\" questions work better), presenting from [facility] for evaluation of chest pain and SOB.  HR was in the low 40s with hypoxia (upper 80%s / lower 90%s on RA) - patient not typically on O2.     Constance Jacobsen MD  10/12/21 2600    "

## 2021-10-12 NOTE — ED PROVIDER NOTES
Emergency Department Encounter     Evaluation Date & Time:   10/12/2021  5:24 PM    CHIEF COMPLAINT:  Chest Pain (Patient brought in via [facility]. )      Triage Note:No notes on file      Impression and Plan       FINAL IMPRESSION:    ICD-10-CM    1. Shortness of breath  R06.02    2. Hip pain, chronic, right  M25.551     G89.29          ED COURSE & MEDICAL DECISION MAKIN year old male, history of previous stroke (ischemic left MCA) with residual aphasia and right-sided weakness, HTN, HLD and epilepsy (on Depakote), who presents for evaluation of chest pain.    History from patient is limited secondary to aphasia - per report from nursing facility, he does better with yes / no questions.  They reported that patient reported chest pain and SOB with O2 sats in the upper 80%s to lower 90%s on RA.      Per patient, he denies headache, chest pain, abdominal pain, N/V/D. He does report feeling SOB.    Patient not hypoxic with O2 sats 94-96% on RA throughout ED course.    On exam, patient in no acute distress. He has RRR with clear lungs. He has some expressive aphasia with right-sided weakness from previous stroke, otherwise no acute neuro changes.    Patient placed on cardiac monitor, IV access established and blood sent for labs.    EKG performed and demonstrated NSR with PACs (new), T wave inversions in the inferior and anterior leads with no ST-T wave elevation consistent with ACS or pericarditis; troponin WNL (<0.01).    Labs otherwise remarkable for no leukocytosis or anemia.  He has mild hyponatremia (Na+ 132) with normal renal function.  Hepatic panel unremarkable with normal coags.  Depakote level therapeutic.    CXR performed and demonstrated diffuse BL interstitial thickening again noted - likely recently stable but shows progression compared to the older CXR from 2015 and is consistent with interstitial lung disease. No new lobar consolidation identified. Normal cardiac silhouette. No  effusion.    BNP mildly elevated (103) with no clinical or radiographic evidence of acute CHF.    A 3-hour repeat EKG was performed and demonstrated NSR with PACs and no ST-T wave changes consistent with ACS or pericarditis with improvement in the T wave inversions.  A 3-hour delta troponin was WNL with no significant changes (0.02).    On reassessment, the patient and his sister think he may have had an anxiety attack.  He reports chronic right hip pain with current pain regimen not helping.    I do not think admission is indicated and patient was discharged back to his long-term nursing facility.  He was given a prescription for a very small number of Norco for his chronic hip pain.  Patient advised to follow-up closely with his PMD.  Return precautions provided.  Patient stable throughout ED course.      5:32 PM I met the patient and performed my initial interview and exam.      At the conclusion of the encounter I discussed the results of all the tests and the disposition. The questions were answered. The patient and family acknowledged understanding and were agreeable with the care plan.        MEDICATIONS GIVEN IN THE EMERGENCY DEPARTMENT:  Medications   aspirin (ASA) chewable tablet 162 mg (162 mg Oral Given 10/12/21 1919)       NEW PRESCRIPTIONS STARTED AT TODAY'S ED VISIT:  New Prescriptions    HYDROCODONE-ACETAMINOPHEN (NORCO) 5-325 MG TABLET    Take 1 tablet by mouth every 8 hours as needed for severe pain       HPI     Information obtained from EMS. No interpretor was used. HPI limited secondary to altered mental status.          Goldy Peraza is a 66 year old male with a pertinent history of previous stroke (ischemic left MCA) with residual aphasia and right-sided weakness, HTN, HLD and epilepsy (on Depakote), who presents to this ED by EMS for evaluation of chest pain.    History from patient is limited secondary to aphasia - per report from nursing facility, he does better with yes / no  questions.    EMS reported that patient is coming from [facility]; medics report that staff were unable to tell them if patient was at baseline mental status. Patient had some urinary incontinence en route (wears a diaper). Vitals were good; patient wanted to be on oxygen even though his oxygen saturations were 100%. His right arm does not function.     I spoke with staff at [facility] and they reported that patient reported chest pain and SOB with O2 sats in the upper 80%s to lower 90%s on RA.     Per patient, he denies headache, chest pain, abdominal pain, N/V/D. He does report feeling SOB.    REVIEW OF SYSTEMS:  Unable to perform full, reliable ROS secondary to aphasia     Medical History     History reviewed. No pertinent past medical history.    Past Surgical History:   Procedure Laterality Date     IR MISCELLANEOUS PROCEDURE  3/18/2009     IR MISCELLANEOUS PROCEDURE  3/20/2009       Social History     Tobacco Use     Smoking status: Former Smoker     Packs/day: 1.00     Types: Cigarettes   Substance Use Topics     Alcohol use: None     Drug use: None       HYDROcodone-acetaminophen (NORCO) 5-325 MG tablet  acetaminophen (TYLENOL) 325 MG tablet  alendronate (FOSAMAX) 70 MG tablet  alfuzosin ER (UROXATRAL) 10 MG 24 hr tablet  aspirin (ASA) 325 MG tablet  atorvastatin (LIPITOR) 80 MG tablet  baclofen (LIORESAL) 20 MG tablet  calcium citrate-vitamin D (CITRACAL) 315-200 MG-UNIT TABS per tablet  cholecalciferol 25 MCG (1000 UT) TABS  diclofenac (VOLTAREN) 1 % topical gel  divalproex sodium extended-release (DEPAKOTE ER) 500 MG 24 hr tablet  divalproex sodium extended-release (DEPAKOTE ER) 500 MG 24 hr tablet  gabapentin (NEURONTIN) 300 MG capsule  lisinopril (ZESTRIL) 40 MG tablet  multivitamin (DEKAS ESSENTIAL) capsule  omeprazole (PRILOSEC) 20 MG DR capsule  senna-docusate (SENOKOT-S/PERICOLACE) 8.6-50 MG tablet  vitamin C (ASCORBIC ACID) 500 MG tablet        Physical Exam     First  Vitals:  Patient Vitals for the past 24 hrs:   BP Temp Temp src Pulse Resp SpO2 Weight   10/12/21 2130 (!) 150/87 -- -- 61 (!) 6 95 % --   10/12/21 2100 (!) 153/93 -- -- 74 -- 94 % --   10/12/21 2045 (!) 149/93 -- -- 67 17 95 % --   10/12/21 2037 -- -- -- -- -- -- 92.1 kg (203 lb)   10/12/21 2030 (!) 147/93 -- -- 61 -- 94 % --   10/12/21 2000 (!) 141/73 -- -- 74 -- 95 % --   10/12/21 1952 -- -- -- -- 16 -- --   10/12/21 1930 (!) 151/89 -- -- 67 -- 94 % --   10/12/21 1800 133/81 -- -- 76 -- 96 % --   10/12/21 1737 -- 98.4  F (36.9  C) Oral -- -- -- --       PHYSICAL EXAM:   Physical Exam    GENERAL: Awake, alert.  In no acute distress.   HEENT: Normocephalic, atraumatic. Pupils equal, round and reactive. Conjunctiva normal. EOMI without nystagmus.  NECK: No stridor.  PULMONARY: Symmetrical breath sounds without distress.  Lungs clear to auscultation bilaterally without wheezes, rhonchi or rales.  CARDIO: Regular rate and rhythm.  No significant murmur, rub or gallop.  Radial pulses strong and symmetrical.  ABDOMINAL: Abdomen soft, non-distended and non-tender to palpation.    EXTREMITIES: No lower extremity swelling or edema.      NEURO: Awake and alert.  Cranial nerves grossly intact.  Strength 5/5 LUE and LLE with right-sided weakness from previous stroke.  PSYCH: Appropriate and cooperative.  SKIN: No rashes.     Results     LAB:  All pertinent labs reviewed and interpreted  Labs Ordered and Resulted from Time of ED Arrival Up to the Time of Departure from the ED   CBC WITH PLATELETS - Abnormal; Notable for the following components:       Result Value    RBC Count 4.23 (*)     All other components within normal limits   BASIC METABOLIC PANEL - Abnormal; Notable for the following components:    Sodium 132 (*)     Urea Nitrogen 24 (*)     All other components within normal limits   B-TYPE NATRIURETIC PEPTIDE ([facility] ONLY) - Abnormal; Notable for the following components:     (*)     All other components  within normal limits   HEPATIC FUNCTION PANEL - Abnormal; Notable for the following components:    Albumin 3.0 (*)     All other components within normal limits   TROPONIN I - Normal   INR - Normal   PARTIAL THROMBOPLASTIN TIME - Normal   TROPONIN I - Normal   VALPROIC ACID   CARDIAC CONTINUOUS MONITORING   MAY SALINE LOCK IV       RADIOLOGY:  Chest XR,  PA & LAT   Final Result   IMPRESSION: Diffuse bilateral interstitial thickening again noted. This is likely recently stable but shows progression compared to the older chest x-ray from 2015. This is consistent with interstitial lung disease. No new lobar consolidation identified.    Normal cardiac silhouette. No effusion.          ECG:  10/12/2021, 17:38; NSR with rate of 84 bpm; PACs; normal intervals; normal conduction; T wave inversions inferior and anterior leads with no ST-T wave elevation consistent with ACS or pericarditis; compared to previous EKG dated 5/27/2009, T wave inversions and PACs are new     EKG independently reviewed and interpreted by Constance Jacobsen MD    10/12/2021, 21:29; NSR with rate of 77 bpm; PACs; normal intervals; normal conduction; no ST-T wave changes consistent with ACS or pericarditis; compared to previous EKG dated 10/12/2021, T wave inversions are improved    EKG independently reviewed and interpreted by Constance Jacobsen MD        I, Ronan Oleary, am serving as a scribe to document services personally performed by Constance Jacobsen MD based on my observation and the provider's statements to me. I, Constance Jacobsen MD attest that Ronan Oleary is acting in a scribe capacity, has observed my performance of the services and has documented them in accordance with my direction.    Constance Jacobsen MD  Emergency Medicine  [facility] EMERGENCY ROOM         Constance Jacobsen MD  10/13/21 [id number]

## 2021-10-13 LAB
ATRIAL RATE - MUSE: 77 BPM
DIASTOLIC BLOOD PRESSURE - MUSE: 92 MMHG
INTERPRETATION ECG - MUSE: NORMAL
P AXIS - MUSE: 25 DEGREES
PR INTERVAL - MUSE: 170 MS
QRS DURATION - MUSE: 92 MS
QT - MUSE: 380 MS
QTC - MUSE: 430 MS
R AXIS - MUSE: 4 DEGREES
SYSTOLIC BLOOD PRESSURE - MUSE: 137 MMHG
T AXIS - MUSE: -1 DEGREES
VENTRICULAR RATE- MUSE: 77 BPM

## 2021-10-13 NOTE — DISCHARGE INSTRUCTIONS
Please follow-up with your Primary Care Provider within 2 days for a recheck; call to arrange appointment.    Return to the ER for worsening symptoms, worsening / recurrent shortness of breath, chest pain, if you pass out or feel that you might, nausea / vomiting, fever or other concerns.    Do not drink alcohol, drive or take other products that contain tylenol (acetaminophen) while using the norco.

## 2022-06-15 ENCOUNTER — MEDICAL CORRESPONDENCE (OUTPATIENT)
Dept: HEALTH INFORMATION MANAGEMENT | Facility: CLINIC | Age: 67
End: 2022-06-15

## 2022-07-08 ENCOUNTER — MEDICAL CORRESPONDENCE (OUTPATIENT)
Dept: HEALTH INFORMATION MANAGEMENT | Facility: CLINIC | Age: 67
End: 2022-07-08

## 2022-07-12 ENCOUNTER — MEDICAL CORRESPONDENCE (OUTPATIENT)
Dept: HEALTH INFORMATION MANAGEMENT | Facility: CLINIC | Age: 67
End: 2022-07-12

## 2022-07-15 ENCOUNTER — TRANSCRIBE ORDERS (OUTPATIENT)
Dept: OTHER | Age: 67
End: 2022-07-15

## 2022-07-15 DIAGNOSIS — H90.2 CONDUCTIVE HEARING LOSS: Primary | ICD-10-CM

## 2022-09-07 ENCOUNTER — OFFICE VISIT (OUTPATIENT)
Dept: AUDIOLOGY | Facility: CLINIC | Age: 67
End: 2022-09-07
Payer: MEDICARE

## 2022-09-07 ENCOUNTER — OFFICE VISIT (OUTPATIENT)
Dept: OTOLARYNGOLOGY | Facility: CLINIC | Age: 67
End: 2022-09-07

## 2022-09-07 ENCOUNTER — TELEPHONE (OUTPATIENT)
Dept: OTOLARYNGOLOGY | Facility: CLINIC | Age: 67
End: 2022-09-07

## 2022-09-07 DIAGNOSIS — H90.5 SENSORINEURAL HEARING LOSS (SNHL), UNSPECIFIED LATERALITY: ICD-10-CM

## 2022-09-07 DIAGNOSIS — H90.5 SENSORINEURAL HEARING LOSS (SNHL), UNSPECIFIED LATERALITY: Primary | ICD-10-CM

## 2022-09-07 DIAGNOSIS — H90.3 SENSORINEURAL HEARING LOSS (SNHL), BILATERAL: Primary | ICD-10-CM

## 2022-09-07 DIAGNOSIS — H90.3 SENSORINEURAL HEARING LOSS (SNHL) OF BOTH EARS: Primary | ICD-10-CM

## 2022-09-07 PROCEDURE — 92557 COMPREHENSIVE HEARING TEST: CPT | Performed by: AUDIOLOGIST

## 2022-09-07 PROCEDURE — 99203 OFFICE O/P NEW LOW 30 MIN: CPT | Performed by: OTOLARYNGOLOGY

## 2022-09-07 PROCEDURE — 92567 TYMPANOMETRY: CPT | Performed by: AUDIOLOGIST

## 2022-09-07 RX ORDER — BACLOFEN 10 MG/1
15 TABLET ORAL
COMMUNITY

## 2022-09-07 RX ORDER — ASPIRIN 325 MG
325 TABLET ORAL
COMMUNITY

## 2022-09-07 RX ORDER — ATORVASTATIN CALCIUM 10 MG/1
TABLET, FILM COATED ORAL
COMMUNITY
Start: 2022-08-24

## 2022-09-07 RX ORDER — ALFUZOSIN HYDROCHLORIDE 10 MG/1
10 TABLET, EXTENDED RELEASE ORAL
COMMUNITY

## 2022-09-07 RX ORDER — FUROSEMIDE 20 MG
TABLET ORAL
COMMUNITY
Start: 2022-08-21

## 2022-09-07 RX ORDER — SENNOSIDES A AND B 8.6 MG/1
8.6 TABLET, FILM COATED ORAL
COMMUNITY
Start: 2021-09-10

## 2022-09-07 RX ORDER — BACLOFEN 5 MG/1
TABLET ORAL
COMMUNITY
Start: 2022-08-25

## 2022-09-07 RX ORDER — DIVALPROEX SODIUM 500 MG/1
1500 TABLET, EXTENDED RELEASE ORAL
COMMUNITY

## 2022-09-07 RX ORDER — OXYCODONE HYDROCHLORIDE 5 MG/1
TABLET ORAL
COMMUNITY
Start: 2022-08-10

## 2022-09-07 RX ORDER — ACETAMINOPHEN 500 MG
1000 TABLET ORAL
COMMUNITY

## 2022-09-07 RX ORDER — LISINOPRIL 30 MG/1
TABLET ORAL
COMMUNITY
Start: 2022-08-20

## 2022-09-07 RX ORDER — GABAPENTIN 400 MG/1
400 CAPSULE ORAL
COMMUNITY
Start: 2022-01-26

## 2022-09-07 RX ORDER — DULOXETIN HYDROCHLORIDE 60 MG/1
CAPSULE, DELAYED RELEASE ORAL
COMMUNITY
Start: 2022-09-01

## 2022-09-07 NOTE — PROGRESS NOTES
HPI: This patient is a 68yo M who presents for evaluation of hearing. There has been a gradual loss of hearing over the past few years in both ears. Denies otalgia, otorrhea, vertigo, and other major medical issues. Served in the . There is bilateral, non-pulsatile tinnitus, most noticeable when it is quiet.    Past medical history, surgical history, social history, family history, medications, and allergies have been reviewed with the patient and are documented above.    Review of Systems: a 10-system review was performed. Pertinent positives are noted in the HPI and on a separate scanned document in the chart.    PHYSICAL EXAMINATION:  GEN: no acute distress, normocephalic  EYES: extraocular movements are intact, pupils are equal and round. Sclera clear.   EARS: auricles are normally formed. The external auditory canals are clear with minimal to no cerumen. Tympanic membranes are intact bilaterally with no signs of infection, effusion, retractions, or perforations.  NOSE: anterior nares are patent. There are no masses or lesions. The septum is non-obstructing.  OC/OP: clear, dentition is in good repair. The tongue and palate are fully mobile and symmetric. No masses or lesions.  NECK: soft and supple. No lymphadenopathy or masses. Airway is midline.  NEURO: CN VII and XII symmetric. alert and oriented. No spontaneous nystagmus. In a wheelchair.   PULM: breathing comfortably on room air, normal chest expansion with respiration  CARDS: no cyanosis or clubbing, normal carotid pulses  PSYCH: some dementia, caregiver is not with him. He was dropped off and left by himself by his living facility without any paperwork.    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 Discussed hearing protection. Medically clear for hearing aids should the patient desire them.

## 2022-09-07 NOTE — LETTER
9/7/2022         RE: Goldy Peraza  [address] [facility] [address]        Dear Colleague,    Thank you for referring your patient, Goldy Peraza, to the [facility]. Please see a copy of my visit note below.    HPI: This patient is a 68yo M who presents for evaluation of hearing. There has been a gradual loss of hearing over the past few years in both ears. Denies otalgia, otorrhea, vertigo, and other major medical issues. Served in the . There is bilateral, non-pulsatile tinnitus, most noticeable when it is quiet.    Past medical history, surgical history, social history, family history, medications, and allergies have been reviewed with the patient and are documented above.    Review of Systems: a 10-system review was performed. Pertinent positives are noted in the HPI and on a separate scanned document in the chart.    PHYSICAL EXAMINATION:  GEN: no acute distress, normocephalic  EYES: extraocular movements are intact, pupils are equal and round. Sclera clear.   EARS: auricles are normally formed. The external auditory canals are clear with minimal to no cerumen. Tympanic membranes are intact bilaterally with no signs of infection, effusion, retractions, or perforations.  NOSE: anterior nares are patent. There are no masses or lesions. The septum is non-obstructing.  OC/OP: clear, dentition is in good repair. The tongue and palate are fully mobile and symmetric. No masses or lesions.  NECK: soft and supple. No lymphadenopathy or masses. Airway is midline.  NEURO: CN VII and XII symmetric. alert and oriented. No spontaneous nystagmus. In a wheelchair.   PULM: breathing comfortably on room air, normal chest expansion with respiration  CARDS: no cyanosis or clubbing, normal carotid pulses  PSYCH: some dementia, caregiver is not with him. He was dropped off and left by himself by his living facility without any paperwork.    AUDIOGRAM: mild to severe HFSNHL, type a  tymps    MEDICAL DECISION-MAKING: This patient is a 66yo M with sensorineural hearing loss. Discussed hearing protection. Medically clear for hearing aids should the patient desire them.        Again, thank you for allowing me to participate in the care of your patient.        Sincerely,        Jany Corona MD

## 2022-09-07 NOTE — TELEPHONE ENCOUNTER
Called and left VS for Krystle (sister) to call the office back. We saw Goldy today in clinic and there were a few concerns for his health and him being seen here in clinc alone.

## 2022-09-07 NOTE — PROGRESS NOTES
AUDIOLOGY REPORT    SUMMARY: Audiology visit completed. See audiogram for results.      RECOMMENDATIONS: Follow-up with ENT.    Niki Freed, CCC-A  Clinical Audiologist  [place] #[id number]

## 2022-10-25 ENCOUNTER — HOSPITAL ENCOUNTER (EMERGENCY)
Facility: CLINIC | Age: 67
Discharge: HOME | End: 2022-10-25
Attending: EMERGENCY MEDICINE | Admitting: EMERGENCY MEDICINE
Payer: MEDICARE

## 2022-10-25 VITALS
HEART RATE: 93 BPM | DIASTOLIC BLOOD PRESSURE: 67 MMHG | HEIGHT: 71 IN | WEIGHT: 203 LBS | SYSTOLIC BLOOD PRESSURE: 108 MMHG | RESPIRATION RATE: 16 BRPM | OXYGEN SATURATION: 97 % | TEMPERATURE: 97.5 F | BODY MASS INDEX: 28.42 KG/M2

## 2022-10-25 DIAGNOSIS — G89.29 OTHER CHRONIC PAIN: ICD-10-CM

## 2022-10-25 PROCEDURE — 99282 EMERGENCY DEPT VISIT SF MDM: CPT

## 2022-10-25 NOTE — DISCHARGE INSTRUCTIONS
You were seen and evaluated here in the emergency department for your pain.  Unfortunately you are sent from the eye clinic due to pain that is chronic and you did not receive your pain medications today.  I will be sending you home so that you can lay down and get out of your pain and also get your home pain medications.    Return with any new or concerning symptoms.

## 2022-10-25 NOTE — ED PROVIDER NOTES
EMERGENCY DEPARTMENT ENCOUNTER      NAME: Goldy Peraza  AGE: 67 year old male  YOB: 1955  MRN: [id number]  EVALUATION DATE & TIME: 10/25/2022  4:26 PM    PCP: Mahesh Nash    ED PROVIDER: Jia Dennison PA-C      Chief Complaint   Patient presents with     Pain         FINAL IMPRESSION:  1. Other chronic pain        MEDICAL DECISION MAKING:    Pertinent Labs & Imaging studies reviewed. (See chart for details)  67 year old male presents to the Emergency Department for evaluation of pain.  Today the patient went to an eye appointment that was apparently supposed to be canceled and so his sister did not show up to be at the appointment with him.  He started to complain of his chronic pain and the eye clinic did not want to send him back to his facility in pain.  They called the nursing facility and told him that they are going to send him to the emergency department.  The nurse at the nursing facility tried to convince the clinic to send him here as this pain is not new and that they can give him his normal pain medications at their facility however, clinic reported that they would not send him home like this and EMS brought him to the emergency department.  On my evaluation, the patient is at his baseline per nursing facility who stated that he does not normally converse and can only really answer yes, no questions.  He was able to tell me a little bit of his symptoms including the fact that this pain is not new and that he did not receive his pain medications today before going to the clinic.  I was able to get a hold of Beverly Roldan RN at his nursing facility who reported that he has not had any new symptoms and confirmed that he did not get his pain medications prior to going to clinic today.  He normally does not leave his bed only about twice per week because the pain is so significant.  I was also able to talk with the patient's sister who again stated that the appointment was was to be  canceled otherwise she would have been at the appointment with him and was asking to have his sent back to his facility as again this pain is not new.  I discussed with the patient discharge home to get his pain medications and he was in agreement and understanding, sister was in agreement and understanding and the nursing facility is aware that he will be sent via EMS and at that time they will be able to get his pain managed.  I told the sister that he is welcome to return to the emergency department if he has any new or concerning symptoms and she is aware and so was the nursing facility.  All questions were answered to the best my ability and the patient was discharged from the emergency department in stable condition.    ED COURSE:  4:27 PM I met with the patient, obtained history, performed an initial exam, and discussed options and plan for diagnostics and treatment here in the ED.    4:45PM I spoke with the patient's nursing facility who confirmed that this is chronic pain and that there has been no new pain.  She also confirmed that they are willing to take him back and we will give him his normal medications for his chronic pain.    4:50 I staffed the patient with Kika Pritchard MD.    4:55 PM Patient discharged after being provided with extensive anticipatory guidance and given return precautions, importance of PCP follow-up emphasized.    At the conclusion of the encounter I discussed the results of all of the tests and the disposition. The questions were answered. The patient or family acknowledged understanding and was agreeable with the care plan.     MEDICATIONS GIVEN IN THE EMERGENCY:  Medications - No data to display    NEW PRESCRIPTIONS STARTED AT TODAY'S ER VISIT  New Prescriptions    No medications on file            =================================================================    HPI:    Patient information was obtained from: The patient's nursing staff    Use of Interpretor: N/A          Goldy Peraza is a 67 year old male with a complicated past medical history including in stroke with right-sided deficits, chronic pain, aphasia who presents to this ED via EMS for evaluation of pain.  The patient was at his eye appointment today and started complaining of pain.  The clinic did not want to send him home and pain so they called the nursing home, said that they were sending him via EMS to the emergency department.      On my evaluation, the patient states that he does have chronic pain and that he has not taken his pain meds today.  He denies any worsening of his symptoms, new weakness, new numbness/tingling or any other concerning symptoms.  History was significantly limited due to the patient's stroke deficits.  I was able to get a hold of the nurse at his assisted living facility who stated that he went to his eye appointment today without his sister and did not get his normal pain medications.  He normally has significant amount of pain when he is not laying flat in his bed and he normally does not leave his bed only about twice per week because of the pain.  She denies any new symptoms and was not wanting him to be sent to the emergency department as this pain is not new and that they have medications to control his pain at their facility.      REVIEW OF SYSTEMS:  Review of Systems Unable to perform due to patient's chronic medical conditions.       PAST MEDICAL HISTORY:  No past medical history on file.    PAST SURGICAL HISTORY:  Past Surgical History:   Procedure Laterality Date     IR MISCELLANEOUS PROCEDURE  3/18/2009     IR MISCELLANEOUS PROCEDURE  3/20/2009           CURRENT MEDICATIONS:    No current facility-administered medications for this encounter.    Current Outpatient Medications:      acetaminophen (TYLENOL) 325 MG tablet, Take 650 mg by mouth every 6 hours as needed, Disp: , Rfl:      acetaminophen (TYLENOL) 500 MG tablet, Take 1,000 mg by mouth, Disp: , Rfl:      alendronate  (FOSAMAX) 70 MG tablet, Take 70 mg by mouth once a week Sunday mornings, first thing with water only. Do not eat or drink for 30 min and remain upright for 30 min, Disp: , Rfl:      alfuzosin ER (UROXATRAL) 10 MG 24 hr tablet, Take 10 mg by mouth, Disp: , Rfl:      alfuzosin ER (UROXATRAL) 10 MG 24 hr tablet, Take 10 mg by mouth daily, Disp: , Rfl:      aspirin (ASA) 325 MG tablet, Take 325 mg by mouth, Disp: , Rfl:      aspirin (ASA) 325 MG tablet, Take 325 mg by mouth daily, Disp: , Rfl:      atorvastatin (LIPITOR) 10 MG tablet, , Disp: , Rfl:      atorvastatin (LIPITOR) 80 MG tablet, Take 40 mg by mouth At Bedtime , Disp: , Rfl:      baclofen (LIORESAL) 10 MG tablet, Take 15 mg by mouth, Disp: , Rfl:      baclofen (LIORESAL) 20 MG tablet, Take 1 tablet (20 mg) by mouth 3 times daily, Disp: , Rfl: 0     Baclofen (LIORESAL) 5 MG tablet, , Disp: , Rfl:      calcium citrate-vitamin D (CITRACAL) 315-200 MG-UNIT TABS per tablet, Take 2 tablets by mouth daily , Disp: , Rfl:      cholecalciferol (VITAMIN D3) 25 mcg (1000 units) capsule, Take 2,000 Units by mouth, Disp: , Rfl:      cholecalciferol 25 MCG (1000 UT) TABS, Take 50 mcg by mouth daily , Disp: , Rfl:      diclofenac (VOLTAREN) 1 % topical gel, Apply 4 g topically 4 times daily as needed To back and right leg, Disp: , Rfl:      divalproex sodium extended-release (DEPAKOTE ER) 500 MG 24 hr tablet, Take 1,500 mg by mouth, Disp: , Rfl:      divalproex sodium extended-release (DEPAKOTE ER) 500 MG 24 hr tablet, Take 1,000 mg by mouth every morning, Disp: , Rfl:      divalproex sodium extended-release (DEPAKOTE ER) 500 MG 24 hr tablet, Take 1,500 mg by mouth At Bedtime, Disp: , Rfl:      DULoxetine (CYMBALTA) 60 MG capsule, , Disp: , Rfl:      furosemide (LASIX) 20 MG tablet, , Disp: , Rfl:      gabapentin (NEURONTIN) 300 MG capsule, Take 300 mg by mouth 3 times daily, Disp: , Rfl:      gabapentin (NEURONTIN) 400 MG capsule, Take 400 mg by mouth, Disp: , Rfl:       "HYDROcodone-acetaminophen (NORCO) 5-325 MG tablet, Take 1 tablet by mouth every 8 hours as needed for severe pain, Disp: 3 tablet, Rfl: 0     Liniments (VAPORIZING CREAM) 4.7-1.2-2.6 % CREA, , Disp: , Rfl:      lisinopril (ZESTRIL) 30 MG tablet, , Disp: , Rfl:      lisinopril (ZESTRIL) 40 MG tablet, Take 30 mg by mouth daily , Disp: , Rfl:      multivitamin (DEKAS ESSENTIAL) capsule, Take 1 capsule by mouth daily, Disp: , Rfl:      omeprazole (PRILOSEC) 20 MG DR capsule, Take 20 mg by mouth every morning 30 min prior to meal, Disp: , Rfl:      oxyCODONE (ROXICODONE) 5 MG tablet, , Disp: , Rfl:      senna (SENOKOT) 8.6 MG tablet, Take 8.6 mg by mouth, Disp: , Rfl:      senna-docusate (SENOKOT-S/PERICOLACE) 8.6-50 MG tablet, Take 2 tablets by mouth 2 times daily as needed, Disp: , Rfl:      vitamin C (ASCORBIC ACID) 500 MG tablet, Take 500 mg by mouth daily, Disp: , Rfl:       ALLERGIES:  No Known Allergies    FAMILY HISTORY:  No family history on file.    SOCIAL HISTORY:   Social History     Socioeconomic History     Marital status: Single   Tobacco Use     Smoking status: Former     Packs/day: 1.00     Types: Cigarettes     Smokeless tobacco: Never       VITALS:  Patient Vitals for the past 24 hrs:   BP Temp Temp src Pulse Resp SpO2 Height Weight   10/25/22 1458 -- -- -- -- -- -- 1.803 m (5' 11\") --   10/25/22 1452 108/67 97.5  F (36.4  C) Temporal 93 16 97 % -- 92.1 kg (203 lb)       PHYSICAL EXAM    Constitutional: Well developed, Well nourished, NAD. Sitting in wheelchair.   HENT: Normocephalic, Atraumatic, Bilateral external ears normal, mask in place, oxygen in place.    Neck: Normal range of motion, No tenderness, Supple, No stridor.  Eyes: Eyes track normally throughout exam, Conjunctiva normal, No discharge.   Respiratory: Normal breath sounds, No respiratory distress, No wheezing, Speaks full sentences easily. No cough.  Cardiovascular: Normal heart rate, Regular rhythm, No murmurs, No rubs, No gallops. " Chest wall nontender.  Musculoskeletal: 2+ DP pulses. AFO on right leg. Decreased strength in right upper and lower extremity which is chronic from his stroke. Normal strength in left upper and lower extremities. No edema. No cyanosis, No tenderness of the CTLS spine.   Integument: Warm, Dry, No erythema, No rash. No petechiae.  Neurologic: Alert, answers yes/no questions which is his baseline.  Psychiatric: Cooperative.       Jia Dennison PA-C  Emergency Medicine  [facility]  10/25/2022      Jia Dennison PA-C  10/25/22 6029

## 2022-10-25 NOTE — ED TRIAGE NOTES
Pt brought in via EMS for pain in left buttock when sitting. Pain is chronic since having a stroke     Triage Assessment     Row Name 10/25/22 6300       Triage Assessment (Adult)    Airway WDL WDL       Respiratory WDL    Respiratory WDL WDL       Skin Circulation/Temperature WDL    Skin Circulation/Temperature WDL WDL       Cardiac WDL    Cardiac WDL WDL       Peripheral/Neurovascular WDL    Peripheral Neurovascular WDL WDL       Cognitive/Neuro/Behavioral WDL    Cognitive/Neuro/Behavioral WDL WDL

## 2022-10-25 NOTE — ED PROVIDER NOTES
"Emergency Department Midlevel Supervisory Note     I personally saw the patient and performed a substantive portion of the visit including all aspects of the medical decision making.    ED Course:  4:18 PM Jia Dennison PA-C staffed patient with me. I agree with their assessment and plan of management, and I will see the patient.  4:22 PM I met with the patient to introduce myself, gather additional history, perform my initial exam, and discuss the plan.     Brief HPI:     Goldy Peraza is a 67 year old male who presents for evaluation of chronic pain. Patient with history of chronic pain, typically lays in bed and only gets up twice per week because of it. Patient had an eye appointment today and because he was having pain there they sent him here. Denies any new changes in chronic pain.    I, Luis Nuñez, am serving as a scribe to documentservices personally performed by Rachel Lucas MD based on my observation and the provider's statements to me. I, Rachel Pritchard MD attest that Luis Nuñez is acting in a scribe capacity, has observed my performance of the services and has documented them in accordance with my direction.    Brief Physical Exam: /67   Pulse 93   Temp 97.5  F (36.4  C) (Temporal)   Resp 16   Ht 1.803 m (5' 11\")   Wt 92.1 kg (203 lb)   SpO2 97%   BMI 28.31 kg/m    Constitutional:  Alert. Chronically ill appearing, wheelchair bound, in no acute distress     MDM:  67-year-old male who is typically bedbound from a CVA with chronic pain here with same.  It turns out, the eye clinic sent him due to pain, but there is [place] nothing different than his baseline and this was confirmed with him and the nursing home.  He and they do not want any further work-up and are requesting discharge back to his facility where he has pain medication for his chronic pain.       1. Other chronic pain        Labs and Imaging:     I have reviewed the relevant laboratory and radiology " studies    Procedures:  I was present for the key portions of this procedure: none    Rachel Pritchard MD  [facility] EMERGENCY ROOM  [facility] [facility] [address]  [phone]       Rachel Pritchard MD  10/25/22 7866

## 2022-10-30 ENCOUNTER — APPOINTMENT (OUTPATIENT)
Dept: CT IMAGING | Facility: HOSPITAL | Age: 67
End: 2022-10-30
Attending: EMERGENCY MEDICINE
Payer: MEDICARE

## 2022-10-30 ENCOUNTER — HOSPITAL ENCOUNTER (EMERGENCY)
Facility: HOSPITAL | Age: 67
Discharge: HOME | End: 2022-10-30
Attending: EMERGENCY MEDICINE | Admitting: EMERGENCY MEDICINE
Payer: MEDICARE

## 2022-10-30 VITALS
HEART RATE: 70 BPM | SYSTOLIC BLOOD PRESSURE: 96 MMHG | TEMPERATURE: 97.9 F | RESPIRATION RATE: 15 BRPM | OXYGEN SATURATION: 99 % | DIASTOLIC BLOOD PRESSURE: 65 MMHG

## 2022-10-30 DIAGNOSIS — G40.919 BREAKTHROUGH SEIZURE (H): ICD-10-CM

## 2022-10-30 LAB
ALBUMIN SERPL BCG-MCNC: 3.1 G/DL (ref 3.5–5.2)
ALBUMIN UR-MCNC: NEGATIVE MG/DL
ALP SERPL-CCNC: 62 U/L (ref 40–129)
ALT SERPL W P-5'-P-CCNC: 8 U/L (ref 10–50)
ANION GAP SERPL CALCULATED.3IONS-SCNC: 7 MMOL/L (ref 7–15)
APPEARANCE UR: CLEAR
AST SERPL W P-5'-P-CCNC: 32 U/L (ref 10–50)
BILIRUB SERPL-MCNC: 0.3 MG/DL
BILIRUB UR QL STRIP: NEGATIVE
BUN SERPL-MCNC: 21.1 MG/DL (ref 8–23)
CALCIUM SERPL-MCNC: 9 MG/DL (ref 8.8–10.2)
CHLORIDE SERPL-SCNC: 103 MMOL/L (ref 98–107)
CK SERPL-CCNC: 72 U/L (ref 39–308)
COLOR UR AUTO: ABNORMAL
CREAT SERPL-MCNC: 0.7 MG/DL (ref 0.67–1.17)
DEPRECATED HCO3 PLAS-SCNC: 33 MMOL/L (ref 22–29)
ERYTHROCYTE [DISTWIDTH] IN BLOOD BY AUTOMATED COUNT: 13.3 % (ref 10–15)
GFR SERPL CREATININE-BSD FRML MDRD: >90 ML/MIN/1.73M2
GLUCOSE SERPL-MCNC: 86 MG/DL (ref 70–99)
GLUCOSE UR STRIP-MCNC: NEGATIVE MG/DL
HCT VFR BLD AUTO: 34.1 % (ref 40–53)
HGB BLD-MCNC: 10.9 G/DL (ref 13.3–17.7)
HGB UR QL STRIP: NEGATIVE
KETONES UR STRIP-MCNC: NEGATIVE MG/DL
LEUKOCYTE ESTERASE UR QL STRIP: NEGATIVE
MCH RBC QN AUTO: 34.2 PG (ref 26.5–33)
MCHC RBC AUTO-ENTMCNC: 32 G/DL (ref 31.5–36.5)
MCV RBC AUTO: 107 FL (ref 78–100)
MUCOUS THREADS #/AREA URNS LPF: PRESENT /LPF
NITRATE UR QL: NEGATIVE
PH UR STRIP: 5.5 [PH] (ref 5–7)
PLATELET # BLD AUTO: 237 10E3/UL (ref 150–450)
POTASSIUM SERPL-SCNC: 4.5 MMOL/L (ref 3.4–5.3)
PROT SERPL-MCNC: 6.6 G/DL (ref 6.4–8.3)
RBC # BLD AUTO: 3.19 10E6/UL (ref 4.4–5.9)
RBC URINE: 0 /HPF
SODIUM SERPL-SCNC: 143 MMOL/L (ref 136–145)
SP GR UR STRIP: 1.02 (ref 1–1.03)
UROBILINOGEN UR STRIP-MCNC: <2 MG/DL
VALPROATE SERPL-MCNC: 84.5 UG/ML
WBC # BLD AUTO: 6 10E3/UL (ref 4–11)
WBC URINE: 1 /HPF

## 2022-10-30 PROCEDURE — 81001 URINALYSIS AUTO W/SCOPE: CPT | Performed by: EMERGENCY MEDICINE

## 2022-10-30 PROCEDURE — G1010 CDSM STANSON: HCPCS

## 2022-10-30 PROCEDURE — 82550 ASSAY OF CK (CPK): CPT | Performed by: EMERGENCY MEDICINE

## 2022-10-30 PROCEDURE — 80164 ASSAY DIPROPYLACETIC ACD TOT: CPT | Performed by: EMERGENCY MEDICINE

## 2022-10-30 PROCEDURE — 36415 COLL VENOUS BLD VENIPUNCTURE: CPT | Performed by: EMERGENCY MEDICINE

## 2022-10-30 PROCEDURE — 99284 EMERGENCY DEPT VISIT MOD MDM: CPT | Mod: 25

## 2022-10-30 PROCEDURE — 85027 COMPLETE CBC AUTOMATED: CPT | Performed by: EMERGENCY MEDICINE

## 2022-10-30 PROCEDURE — 80053 COMPREHEN METABOLIC PANEL: CPT | Performed by: EMERGENCY MEDICINE

## 2022-10-30 RX ORDER — LORAZEPAM 2 MG/ML
1 INJECTION INTRAMUSCULAR ONCE
Status: DISCONTINUED | OUTPATIENT
Start: 2022-10-30 | End: 2022-10-30 | Stop reason: HOSPADM

## 2022-10-30 ASSESSMENT — ACTIVITIES OF DAILY LIVING (ADL)
ADLS_ACUITY_SCORE: 35

## 2022-10-30 ASSESSMENT — ENCOUNTER SYMPTOMS: SEIZURES: 1

## 2022-10-30 NOTE — ED PROVIDER NOTES
"EMERGENCY DEPARTMENT ENCOUNTER      NAME: Goldy Peraza  AGE: 67 year old male  YOB: 1955  MRN: [id number]  EVALUATION DATE & TIME: 10/30/2022  7:34 AM    PCP: Mahesh Nash    ED PROVIDER: Collin Encarnacion MD      Chief Complaint   Patient presents with     Seizures         FINAL IMPRESSION:  Breakthrough seizure      ED COURSE & MEDICAL DECISION MAKIN:37 AM I met with patient for initial interview and encounter.  9:17 AM.  Total CK normal at 72.  Hemoglobin slightly reduced at 10.9, essentially unchanged.  Comprehensive metabolic panel unremarkable.  Awaiting UA and Depakote level  9:24 AM.  Depakote level normal.  We will proceed with cath UA.  CT pending.  12:36 PM.  CT of the head unremarkable.  No acute findings.  Urine analysis without evidence of infection.  Patient elderly male with prior stroke and right-sided hemiplegia.  History of seizures now with likely breakthrough seizures.  No evidence of infectious process or electrolyte imbalance would be contributory.  Depakote level appropriate.  Will return to care facility.      Pertinent Labs & Imaging studies reviewed. (See chart for details)  67 year old male presents to the Emergency Department for evaluation of a seizure.  Patient with underlying history of dementia and dense aphasia after prior MCA occlusion.  Per report patient with seizure-like activity and decreased mentation.  Patient presently responding \"no\" to all inquiries.  Not following commands.  Could be postictal state versus aphasia issues.  Labs sent to assess for infectious process or electrolyte imbalance be contributory.  CT of head also been obtained along with urine analysis.  Depakote level also being obtained.  Ativan to be at bedside as a precaution.  Exam reveals obese male lying quietly in bed.  Right hemiplegia with contractures of the right upper extremity noted.  Respiratory status normal.  No abdominal tenderness.         At the conclusion of the " encounter I discussed the results of all of the tests and the disposition. The questions were answered. The patient or family acknowledged understanding and was agreeable with the care plan.     0 minutes of critical care time     MEDICATIONS GIVEN IN THE EMERGENCY:  Medications   LORazepam (ATIVAN) injection 1 mg (has no administration in time range)       NEW PRESCRIPTIONS STARTED AT TODAY'S ER VISIT  New Prescriptions    No medications on file          =================================================================    HPI    Patient information was obtained from: Nurse    Use of : N/A        Goldy Peraza is a 67 year old male with a pertinent history of seizures who presents to this ED via EMS for evaluation of a seizure.    Nurse reports patient had a seizure. He is not on seizure medicine. EMS reports staff is unsure how long seizure lasted, but reports they saw his eyes darting back and forth and was unresponsive. Patient is now following commands and replying to voice slowly but keeping eyes closed. He denies pain, or any other complaints.    HPI limited kia to patient not fully responding.      REVIEW OF SYSTEMS   Review of Systems   Neurological: Positive for seizures.   All other systems reviewed and are negative.       PAST MEDICAL HISTORY:  History reviewed. No pertinent past medical history.    PAST SURGICAL HISTORY:  Past Surgical History:   Procedure Laterality Date     IR MISCELLANEOUS PROCEDURE  3/18/2009     IR MISCELLANEOUS PROCEDURE  3/20/2009           CURRENT MEDICATIONS:    acetaminophen (TYLENOL) 325 MG tablet  acetaminophen (TYLENOL) 500 MG tablet  alendronate (FOSAMAX) 70 MG tablet  alfuzosin ER (UROXATRAL) 10 MG 24 hr tablet  alfuzosin ER (UROXATRAL) 10 MG 24 hr tablet  aspirin (ASA) 325 MG tablet  aspirin (ASA) 325 MG tablet  atorvastatin (LIPITOR) 10 MG tablet  atorvastatin (LIPITOR) 80 MG tablet  baclofen (LIORESAL) 10 MG tablet  baclofen (LIORESAL) 20 MG tablet  Baclofen  (LIORESAL) 5 MG tablet  calcium citrate-vitamin D (CITRACAL) 315-200 MG-UNIT TABS per tablet  cholecalciferol (VITAMIN D3) 25 mcg (1000 units) capsule  cholecalciferol 25 MCG (1000 UT) TABS  diclofenac (VOLTAREN) 1 % topical gel  divalproex sodium extended-release (DEPAKOTE ER) 500 MG 24 hr tablet  divalproex sodium extended-release (DEPAKOTE ER) 500 MG 24 hr tablet  divalproex sodium extended-release (DEPAKOTE ER) 500 MG 24 hr tablet  DULoxetine (CYMBALTA) 60 MG capsule  furosemide (LASIX) 20 MG tablet  gabapentin (NEURONTIN) 400 MG capsule  HYDROcodone-acetaminophen (NORCO) 5-325 MG tablet  Liniments (VAPORIZING CREAM) 4.7-1.2-2.6 % CREA  lisinopril (ZESTRIL) 30 MG tablet  lisinopril (ZESTRIL) 40 MG tablet  multivitamin (DEKAS ESSENTIAL) capsule  omeprazole (PRILOSEC) 20 MG DR capsule  oxyCODONE (ROXICODONE) 5 MG tablet  senna (SENOKOT) 8.6 MG tablet  senna-docusate (SENOKOT-S/PERICOLACE) 8.6-50 MG tablet  vitamin C (ASCORBIC ACID) 500 MG tablet        ALLERGIES:  No Known Allergies    FAMILY HISTORY:  History reviewed. No pertinent family history.    SOCIAL HISTORY:   Social History     Socioeconomic History     Marital status: Single     Spouse name: None     Number of children: None     Years of education: None     Highest education level: None   Tobacco Use     Smoking status: Former     Packs/day: 1.00     Types: Cigarettes     Smokeless tobacco: Never       VITALS:  /69   Pulse 77   Temp 97.9  F (36.6  C) (Oral)   Resp 18   SpO2 100%     PHYSICAL EXAM    Constitutional: Obese male resting quietly on cot   HENT: Normocephalic, Atraumatic, Bilateral external ears normal, Oropharynx normal, mucous membranes moist, Nose normal. Neck-  Normal range of motion  Eyes: PERRL, EOMI, Conjunctiva normal, No discharge.   Respiratory: Normal breath sounds, No respiratory distress, No wheezing,   Cardiovascular: Normal heart rate, Regular rhythm,  No murmurs, No rubs, No gallops. Chest wall nontender.    GI:   obesity. Bowel sounds normal, Soft, No tenderness, No masses, No flank tenderness. Musculoskeletal: Mild lower extremity edema no cyanosis, No clubbing.  Contractures to right upper extremity.  Some atrophy to the right lower extremity.    Integument: Warm, Dry, No erythema, No rash. No petechiae.  Neurologic: Verbalizing no to all inquiries.  Does not follow simple commands.  Right hemiplegia.  Moving left side spontaneously.  LAB:  Results for orders placed or performed during the hospital encounter of 10/30/22   Head CT w/o contrast     Status: None    Narrative    EXAM: CT HEAD W/O CONTRAST  LOCATION: [facility]  DATE/TIME: 10/30/2022 9:41 AM    INDICATION: Seizure.  COMPARISON: CT head without contrast 07/29/2021.  TECHNIQUE: Routine CT Head without IV contrast. Multiplanar reformats. Dose reduction techniques were used.    FINDINGS:  INTRACRANIAL CONTENTS: No acute intracranial hemorrhage. No extra-axial fluid collection. No mass effect or midline shift. Large area of encephalomalacia in the left cerebral hemisphere with involvement of the left temporal and frontal lobes, left basal   ganglia, and left insula. There is compensatory dilatation of the left lateral ventricle. No midline shift. Mild presumed chronic small vessel ischemic changes. Moderate generalized volume loss. No hydrocephalus.     VISUALIZED ORBITS/SINUSES/MASTOIDS: No intraorbital abnormality. No paranasal sinus mucosal disease. No middle ear or mastoid effusion.    BONES/SOFT TISSUES: No acute abnormality.      Impression    IMPRESSION:  1.  No acute intracranial hemorrhage.  2.  Unchanged chronic left MCA distribution infarct.   Comprehensive metabolic panel     Status: Abnormal   Result Value Ref Range    Sodium 143 136 - 145 mmol/L    Potassium 4.5 3.4 - 5.3 mmol/L    Chloride 103 98 - 107 mmol/L    Carbon Dioxide (CO2) 33 (H) 22 - 29 mmol/L    Anion Gap 7 7 - 15 mmol/L    Urea Nitrogen 21.1 8.0 - 23.0 mg/dL     Creatinine 0.70 0.67 - 1.17 mg/dL    Calcium 9.0 8.8 - 10.2 mg/dL    Glucose 86 70 - 99 mg/dL    Alkaline Phosphatase 62 40 - 129 U/L    AST 32 10 - 50 U/L    ALT 8 (L) 10 - 50 U/L    Protein Total 6.6 6.4 - 8.3 g/dL    Albumin 3.1 (L) 3.5 - 5.2 g/dL    Bilirubin Total 0.3 <=1.2 mg/dL    GFR Estimate >90 >60 mL/min/1.73m2   CBC (+ platelets, no diff)     Status: Abnormal   Result Value Ref Range    WBC Count 6.0 4.0 - 11.0 10e3/uL    RBC Count 3.19 (L) 4.40 - 5.90 10e6/uL    Hemoglobin 10.9 (L) 13.3 - 17.7 g/dL    Hematocrit 34.1 (L) 40.0 - 53.0 %     (H) 78 - 100 fL    MCH 34.2 (H) 26.5 - 33.0 pg    MCHC 32.0 31.5 - 36.5 g/dL    RDW 13.3 10.0 - 15.0 %    Platelet Count 237 150 - 450 10e3/uL   Valproic acid     Status: Normal   Result Value Ref Range    Valproic acid 84.5   ug/mL   UA with Microscopic reflex to Culture     Status: Abnormal    Specimen: Urine, Catheter   Result Value Ref Range    Color Urine Light Yellow Colorless, Straw, Light Yellow, Yellow    Appearance Urine Clear Clear    Glucose Urine Negative Negative mg/dL    Bilirubin Urine Negative Negative    Ketones Urine Negative Negative mg/dL    Specific Gravity Urine 1.018 1.001 - 1.030    Blood Urine Negative Negative    pH Urine 5.5 5.0 - 7.0    Protein Albumin Urine Negative Negative mg/dL    Urobilinogen Urine <2.0 <2.0 mg/dL    Nitrite Urine Negative Negative    Leukocyte Esterase Urine Negative Negative    Mucus Urine Present (A) None Seen /LPF    RBC Urine 0 <=2 /HPF    WBC Urine 1 <=5 /HPF    Narrative    Urine Culture not indicated   CK total     Status: Normal   Result Value Ref Range    CK 72 39 - 308 U/L         Cortez DUTTA, am serving as a scribe to document services personally performed by Collin Encarnacion MD based on my observation and the provider's statements to me. I, Collin Encarnacion MD, attest that Cortez Fernandez is acting in a scribe capacity, has observed my performance of the services and has documented them in accordance  with my direction.    Collin Encarnacion MD  [facility] EMERGENCY DEPARTMENT  [facility] [facility] [id number] [phone]     Collin Encarnacion MD  10/30/22 0846

## 2022-10-30 NOTE — ED TRIAGE NOTES
Pt presents to [facility] alone via ems for seizure. Pt has hx of seizures. EMS reports staff is unsure how long seizure lasted, but reports they saw pts eyes darting back and forth. Pt was slow to respond initially when EMS on scene. Pt also has hx of dementia but staff reports he can usually converse although confuse. En route pt became more responsive and was responding to EMS. EKG showed NSR, and blood glucose of 93. Pt following commands and replying to voice but keeping eyes closed. Pt is on 2L NC which is his baseline.